# Patient Record
Sex: FEMALE | Race: WHITE | NOT HISPANIC OR LATINO | Employment: OTHER | URBAN - METROPOLITAN AREA
[De-identification: names, ages, dates, MRNs, and addresses within clinical notes are randomized per-mention and may not be internally consistent; named-entity substitution may affect disease eponyms.]

---

## 2020-08-13 LAB
EXTERNAL CHLAMYDIA RESULT: NEGATIVE
N GONORRHOEA RRNA SPEC QL PROBE: NEGATIVE

## 2020-08-27 ENCOUNTER — TRANSCRIBE ORDERS (OUTPATIENT)
Dept: PERINATAL CARE | Facility: CLINIC | Age: 30
End: 2020-08-27

## 2020-08-27 DIAGNOSIS — O09.899 SUPERVISION OF OTHER HIGH RISK PREGNANCIES, UNSPECIFIED TRIMESTER: ICD-10-CM

## 2020-08-27 DIAGNOSIS — O99.810 ABNORMAL GLUCOSE COMPLICATING PREGNANCY: Primary | ICD-10-CM

## 2020-08-27 LAB
EXTERNAL HIV SCREEN: NORMAL
HCV AB SER-ACNC: NORMAL

## 2020-08-31 ENCOUNTER — TRANSCRIBE ORDERS (OUTPATIENT)
Dept: PERINATAL CARE | Facility: CLINIC | Age: 30
End: 2020-08-31

## 2020-08-31 DIAGNOSIS — O09.899 SUPERVISION OF OTHER HIGH RISK PREGNANCIES, UNSPECIFIED TRIMESTER: Primary | ICD-10-CM

## 2020-09-14 ENCOUNTER — TELEMEDICINE (OUTPATIENT)
Dept: PERINATAL CARE | Facility: CLINIC | Age: 30
End: 2020-09-14
Payer: MEDICARE

## 2020-09-14 VITALS — WEIGHT: 275 LBS | HEIGHT: 65 IN | BODY MASS INDEX: 45.82 KG/M2

## 2020-09-14 DIAGNOSIS — Z3A.13 13 WEEKS GESTATION OF PREGNANCY: ICD-10-CM

## 2020-09-14 DIAGNOSIS — O99.211 OBESITY AFFECTING PREGNANCY IN FIRST TRIMESTER: ICD-10-CM

## 2020-09-14 DIAGNOSIS — O24.111 PRE-EXISTING TYPE 2 DIABETES MELLITUS WITH HYPERGLYCEMIA DURING PREGNANCY IN FIRST TRIMESTER (HCC): Primary | ICD-10-CM

## 2020-09-14 DIAGNOSIS — E11.65 PRE-EXISTING TYPE 2 DIABETES MELLITUS WITH HYPERGLYCEMIA DURING PREGNANCY IN FIRST TRIMESTER (HCC): Primary | ICD-10-CM

## 2020-09-14 DIAGNOSIS — O99.810 ABNORMAL GLUCOSE COMPLICATING PREGNANCY: ICD-10-CM

## 2020-09-14 DIAGNOSIS — R73.09 HEMOGLOBIN A1C GREATER THAN 9%, INDICATING POOR DIABETIC CONTROL: ICD-10-CM

## 2020-09-14 PROBLEM — F20.9 SCHIZOPHRENIA (HCC): Status: ACTIVE | Noted: 2018-12-02

## 2020-09-14 PROBLEM — F32.A DEPRESSION: Status: ACTIVE | Noted: 2020-09-14

## 2020-09-14 PROBLEM — Z87.2 HISTORY OF ECZEMA: Status: ACTIVE | Noted: 2020-09-14

## 2020-09-14 PROBLEM — O36.4XX0 IUFD AT 20 WEEKS OR MORE OF GESTATION: Status: ACTIVE | Noted: 2018-12-02

## 2020-09-14 PROCEDURE — 99215 OFFICE O/P EST HI 40 MIN: CPT | Performed by: NURSE PRACTITIONER

## 2020-09-14 RX ORDER — LANCETS 33 GAUGE
EACH MISCELLANEOUS
Qty: 100 EACH | Refills: 3 | Status: SHIPPED | OUTPATIENT
Start: 2020-09-14

## 2020-09-14 RX ORDER — INSULIN GLARGINE 100 [IU]/ML
30 INJECTION, SOLUTION SUBCUTANEOUS
Qty: 5 PEN | Refills: 0 | Status: SHIPPED | OUTPATIENT
Start: 2020-09-14

## 2020-09-14 RX ORDER — BLOOD SUGAR DIAGNOSTIC
STRIP MISCELLANEOUS
Qty: 100 EACH | Refills: 3 | Status: SHIPPED | OUTPATIENT
Start: 2020-09-14

## 2020-09-14 RX ORDER — BLOOD-GLUCOSE METER
EACH MISCELLANEOUS
Qty: 1 KIT | Refills: 0 | Status: SHIPPED | OUTPATIENT
Start: 2020-09-14

## 2020-09-14 NOTE — ASSESSMENT & PLAN NOTE
-Start Lantus 30 units at 7 PM daily  Add 3 AM glucose check    -Increase Metformin 500 mg with breakfast and 1000 mg with dinner   -Will need bolus insulin added to regimen    -Start SMBG and GDM diet     Lab Results   Component Value Date    HGBA1C 11 3 (H) 02/17/2020

## 2020-09-14 NOTE — PROGRESS NOTES
Virtual Regular Visit      Assessment/Plan:    Problem List Items Addressed This Visit        Endocrine    Pre-existing type 2 diabetes mellitus with hyperglycemia during pregnancy in first trimester ([facility] [address] ) - Primary     -Start Lantus 30 units at 7 PM daily  Add 3 AM glucose check    -Increase Metformin 500 mg with breakfast and 1000 mg with dinner   -Will need bolus insulin added to regimen    -Start SMBG and GDM diet     Lab Results   Component Value Date    HGBA1C 11 3 (H) 02/17/2020            Relevant Medications    Blood Glucose Monitoring Suppl (OneTouch Verio Flex System) w/Device KIT    OneTouch Delica Lancets 36N MISC    OneTouch Verio test strip    insulin glargine (Lantus SoloStar) 100 units/mL injection pen    Insulin Pen Needle 31G X 5 MM MISC    metFORMIN (GLUCOPHAGE) 500 mg tablet    Other Relevant Orders    Hemoglobin A1C    Comprehensive metabolic panel    TSH, 3rd generation with Free T4 reflex    Protein / creatinine ratio, urine    Mychart glucose flowsheet    Hemoglobin A1C greater than 9%, indicating poor diabetic control    Relevant Medications    insulin glargine (Lantus SoloStar) 100 units/mL injection pen    metFORMIN (GLUCOPHAGE) 500 mg tablet    Other Relevant Orders    Mychart glucose flowsheet       Other    13 weeks gestation of pregnancy    Relevant Medications    Blood Glucose Monitoring Suppl (OneTouch Verio Flex System) w/Device KIT    OneTouch Delica Lancets 27D MISC    OneTouch Verio test strip    insulin glargine (Lantus SoloStar) 100 units/mL injection pen    Insulin Pen Needle 31G X 5 MM MISC    metFORMIN (GLUCOPHAGE) 500 mg tablet    Other Relevant Orders    Hemoglobin A1C    Comprehensive metabolic panel    TSH, 3rd generation with Free T4 reflex    Protein / creatinine ratio, urine    Mychart glucose flowsheet    Obesity affecting pregnancy in first trimester    Relevant Medications    insulin glargine (Lantus SoloStar) 100 units/mL injection pen    Insulin Pen Needle 31G X 5 MM MISC    metFORMIN (GLUCOPHAGE) 500 mg tablet    Other Relevant Orders    Hemoglobin A1C    Comprehensive metabolic panel    TSH, 3rd generation with Free T4 reflex    Protein / creatinine ratio, urine    Mychart glucose flowsheet    BMI 45 0-49 9, adult (HCC)    Relevant Medications    insulin glargine (Lantus SoloStar) 100 units/mL injection pen    Insulin Pen Needle 31G X 5 MM MISC    metFORMIN (GLUCOPHAGE) 500 mg tablet    Other Relevant Orders    Hemoglobin A1C    Comprehensive metabolic panel    TSH, 3rd generation with Free T4 reflex    Protein / creatinine ratio, urine    Mychart glucose flowsheet    Abnormal glucose complicating pregnancy    Relevant Medications    metFORMIN (GLUCOPHAGE) 500 mg tablet    Other Relevant Orders    Mychart glucose flowsheet        -Check A1c, CMP, TSH with free T4 and urine protein/creatinine  -A1c goal is less than 6% with minimal hypoglycemia  -Due to elevated A1c and FBS>200's, start Lantus 30 units at 7 PM daily  Add 3 AM glucose check   -Increase Metformin 500 mg with breakfast and 1000 mg with dinner    -Please sign up for Tendr    -Review basaglar insulin pen education video online    -Via e-mail patient education regarding hypoglycemia; Lantus quick reference sheet; sites of injection; insulin requirements during pregnancy; basal bolus concept and pre-eclampsia  -Very important to maintain tight glucose control during pregnancy to decrease risk factors including fetal macrosomia;  hypoglycemia; birth injury; risk of ; risk of pre-eclampsia; polyhydramnios;  labor and stillbirth  1  Start self monitoring blood glucose fasting and 2 hours after start of each meal  Keep glucose log   Glucose goals: fasting 60-90 mg/dL, 135 mg/dL or less 1 hour post meals, and 120 mg/dL or less 2 hours post meal    2  Report glucose readings weekly via Tendr flowsheet or phone [phone]    3  Start GDM diet with 3 meals and 3 snacks including recommended combination of carb, protein and fat per meal/snack  4  Please eat meal or snack every 2-3 5 hours while awake  5  No more than 8 to 10 hours of fasting overnight  6  Stay active if no restriction from your OB, walk up to 30 minutes a day  7  Always have glucose available to treat hypoglycemia  Use 15:15 rule  Refer to hypoglycemia patient education sheet  Test blood sugar when experiencing signs and symptoms of hypoglycemia and prior to driving  8  Continue prenatal vitamin as recommended  9  Continue follow-up with your OB and MFM as recommended  10  Follow up in: 2-3 weeks  11  Keep dietitian appointment as scheduled  Reason for visit is T2DM  Chief Complaint   Patient presents with    Virtual Regular Visit    Diabetes Type 2    Patient Education        Encounter provider Nahed Delcid, [facility]    Provider located at [facility] [address] [address] [phone]      Recent Visits  No visits were found meeting these conditions  Showing recent visits within past 7 days and meeting all other requirements     Today's Visits  Date Type Provider Dept   09/14/20 Telemedicine Nahed Delcid, [facility] today's visits and meeting all other requirements     Future Appointments  No visits were found meeting these conditions  Showing future appointments within next 150 days and meeting all other requirements        The patient was identified by name and date of birth  Lazaro Nolen was informed that this is a telemedicine visit and that the visit is being conducted through [employer]  My office door was closed  No one else was in the room  She acknowledged consent and understanding of privacy and security of the video platform  The patient has agreed to participate and understands they can discontinue the visit at any time  Patient is aware this is a billable service  Subjective  Joseph Wilder is a 26 y o  female L0, stillborn at 9 1/2 months  Currently 13 0/7 weeks gestation and T2DM diagnosed 3/3020 with A1c 10 1% per Tabitha's mother, chart shows A1c 11 3% on 20, currently on Metformin 500 mg twice a day  Seen by a nutritionist  Domitila Sebastian to start on insulin sooner for pregnancy due to insurance issues  [facility]  Future plans for Greta Cisneros and  to move in with her mother  Referred for diabetes and pregnancy management  Currently eating 3 meals a day and 3 snacks a day  Testing glucose twice a day, reports glucose readings over 200's  Past Medical History:   Diagnosis Date    Chlamydia     Diabetes ([address] ) 2020    Migraines     Neurological disorder     Schizoaffective disorder (HCC)        Past Surgical History:   Procedure Laterality Date    TYMPANOSTOMY TUBE PLACEMENT      WISDOM TOOTH EXTRACTION         Current Outpatient Medications   Medication Sig Dispense Refill    Blood Glucose Monitoring Suppl (OneTouch Verio Flex System) w/Device KIT Dispense 1 kit per insurance formulary  1 kit 0    insulin glargine (Lantus SoloStar) 100 units/mL injection pen Inject 30 Units under the skin daily at bedtime At 7 PM daily  To be titrated  5 pen 0    Insulin Pen Needle 31G X 5 MM MISC Inject under the skin daily at bedtime Use one a day or as directed  100 each 1    metFORMIN (GLUCOPHAGE) 500 mg tablet Take 1 tablet with breakfast and 2 tablets with dinner  To be titrated to total 2000 mg a day  120 tablet 3    OneTouch Delica Lancets 74V MISC Use 4 a day or as directed  100 each 3    OneTouch Verio test strip Test 4 times a day and as instructed  Gestational diabetes  100 each 3    Prenatal Vit-Fe Fumarate-FA (PRENATAL VITAMIN PO) Take 1 tablet by mouth daily       No current facility-administered medications for this visit           Allergies   Allergen Reactions    Cefaclor Rash     Other reaction(s): Itching Review of Systems   Constitutional: Positive for fatigue  Negative for fever  HENT: Negative for congestion, sore throat and trouble swallowing  Eyes: Negative for visual disturbance  Last eye exam greater than 1 year ago  Respiratory: Negative for cough and shortness of breath  Cardiovascular: Negative for chest pain, palpitations and leg swelling  Gastrointestinal: Negative for constipation, diarrhea, nausea and vomiting  Endocrine: Positive for polydipsia and polyphagia  Negative for polyuria  Genitourinary: Negative for difficulty urinating and vaginal bleeding  Skin: Positive for rash  Eczema    Allergic/Immunologic: Negative for environmental allergies  Neurological: Positive for headaches  Negative for dizziness  History of migraines  Psychiatric/Behavioral: Negative for sleep disturbance  Video Exam    Vitals:    09/14/20 1053   Weight: 125 kg (275 lb)   Height: 5' 5" (1 651 m)       Physical Exam  Constitutional:       Appearance: She is obese  HENT:      Head: Normocephalic  Nose: Nose normal    Eyes:      Conjunctiva/sclera: Conjunctivae normal    Pulmonary:      Effort: Pulmonary effort is normal    Neurological:      Mental Status: She is alert and oriented to person, place, and time  Psychiatric:         Mood and Affect: Mood normal          Behavior: Behavior normal          Thought Content: Thought content normal          Judgment: Judgment normal           I spent 60  minutes directly with the patient during this visit  VIRTUAL VISIT DISCLAIMER    Dar Fontenot acknowledges that she has consented to an online visit or consultation  She understands that the online visit is based solely on information provided by her, and that, in the absence of a face-to-face physical evaluation by the physician, the diagnosis she receives is both limited and provisional in terms of accuracy and completeness   This is not intended to replace a full medical face-to-face evaluation by the physician  Sis Peraza understands and accepts these terms

## 2020-09-14 NOTE — PATIENT INSTRUCTIONS
-Check A1c, CMP, TSH with free T4 and urine protein/creatinine  -A1c goal is less than 6% with minimal hypoglycemia  -Due to elevated A1c and FBS>200's, start Lantus 30 units at 7 PM daily  Add 3 AM glucose check  Be sure to have bedtime snack that includes 15 grams of carbohydrates with 2 ounces of protein    -Increase Metformin 500 mg with breakfast and 1000 mg with dinner    -Please sign up for Game9z    -Review basaglar insulin pen education video online    -Via e-mail patient education regarding hypoglycemia; Lantus quick reference sheet; sites of injection; insulin requirements during pregnancy; basal bolus concept and pre-eclampsia  -Very important to maintain tight glucose control during pregnancy to decrease risk factors including fetal macrosomia;  hypoglycemia; birth injury; risk of ; risk of pre-eclampsia; polyhydramnios;  labor and stillbirth  1  Start self monitoring blood glucose fasting and 2 hours after start of each meal  Keep glucose log  Glucose goals: fasting 60-90 mg/dL, 135 mg/dL or less 1 hour post meals, and 120 mg/dL or less 2 hours post meal    2  Report glucose readings weekly via Game9z flowsheet or phone [phone]    3  Start GDM diet with 3 meals and 3 snacks including recommended combination of carb, protein and fat per meal/snack  4  Please eat meal or snack every 2-3 5 hours while awake  5  No more than 8 to 10 hours of fasting overnight  6  Stay active if no restriction from your OB, walk up to 30 minutes a day  7  Always have glucose available to treat hypoglycemia  Use 15:15 rule  Refer to hypoglycemia patient education sheet  Test blood sugar when experiencing signs and symptoms of hypoglycemia and prior to driving  8  Continue prenatal vitamin as recommended  9  Continue follow-up with your OB and MFM as recommended  10  Follow up in: 2-3 weeks  11  Keep dietitian appointment as scheduled

## 2020-09-15 ENCOUNTER — DOCUMENTATION (OUTPATIENT)
Dept: PERINATAL CARE | Facility: CLINIC | Age: 30
End: 2020-09-15

## 2020-09-15 ENCOUNTER — TELEMEDICINE (OUTPATIENT)
Dept: PERINATAL CARE | Facility: CLINIC | Age: 30
End: 2020-09-15
Payer: MEDICARE

## 2020-09-15 ENCOUNTER — TELEPHONE (OUTPATIENT)
Dept: PERINATAL CARE | Facility: CLINIC | Age: 30
End: 2020-09-15

## 2020-09-15 DIAGNOSIS — R73.09 HEMOGLOBIN A1C GREATER THAN 9%, INDICATING POOR DIABETIC CONTROL: ICD-10-CM

## 2020-09-15 DIAGNOSIS — O99.211 OBESITY AFFECTING PREGNANCY IN FIRST TRIMESTER: ICD-10-CM

## 2020-09-15 DIAGNOSIS — E11.65 PRE-EXISTING TYPE 2 DIABETES MELLITUS WITH HYPERGLYCEMIA DURING PREGNANCY IN FIRST TRIMESTER (HCC): Primary | ICD-10-CM

## 2020-09-15 DIAGNOSIS — O24.111 PRE-EXISTING TYPE 2 DIABETES MELLITUS WITH HYPERGLYCEMIA DURING PREGNANCY IN FIRST TRIMESTER (HCC): Primary | ICD-10-CM

## 2020-09-15 DIAGNOSIS — Z3A.13 13 WEEKS GESTATION OF PREGNANCY: ICD-10-CM

## 2020-09-15 PROCEDURE — G0108 DIAB MANAGE TRN  PER INDIV: HCPCS

## 2020-09-15 NOTE — PROGRESS NOTES
Demographics:  Language: English  Ethnicity: White/   Country of Origin: [place]    Education/Occupation:  Highest grade completed: not sure of grade completed  Occupation: Homemaker    Personal & Family History:  Personal history of diabetes? Yes type 2  Family members with diabetes: Mother, Father and maternal and paternal grandparents    Pregnancy History:  How many total pregnancies have you had? Other 7  How many children do you have? Other 0  Are you having swelling or fluid retention? no  Have you been placed on bedrest? no    Physical Activity:  Do you exercise? yes  Type of Exercise: Walking  Frequency of Exercise: Daily with the dogs    Nutrition Questionnaire: How many meals do you eat daily? 3  List times of meals: Breakfast: 10:30 am/ Lunch: 2-3 PM/ Dinner: 6:30 PM  How many snacks do you eat daily? 2  List times of snacks: Other between meals  What type of diet are you following at home? Regular  Do you have special or ethnic dietary preferences? no  Do you have any food allergies or intolerances? no  When was your last meal? 10 am  List what you ate and the amounts: whole wheat toast with butter and jelly, cheese    Learning preferences: How do you learn best? Video  How do you rate your health? Ayah Alexandra  Who is your primary support person? Spouse and Family Member mother  How do you cope with stress? Patient reported not that good  Do you have any cultural or Jainism beliefs we should be aware of? no  How do you feel the diabetes diagnosis will affect the rest of your pregnancy? Not sure but nervous  Do you receive WIC or food stamps?  Yes both

## 2020-09-15 NOTE — TELEPHONE ENCOUNTER
Attempted to reach patient by phone and left voicemail to confirm appointment for MFM ultrasound  1 support person ( must be over the age of 15) may accompany you for your appointment  If you or your support person have traveled outside the state in the past 2 weeks, please call and notify our office today #[phone]  You and your support person must wear a mask ,covering nose and mouth,during your entire visit  You and your support person will have temperature screened upon arrival     To minimize your exposure in our waiting room, please call our office prior to entering the building  Check in and rooming questions will be done via phone  We will give you directions when to enter for your appointment  Inside office # provided:  Jill Fuentes line: [phone]  [facility] line:  [phone]  [facility] line:  [phone] line:  [phone]  Ximena Marshall line:  [phone]  [place] line:  [phone]    IF you are not feeling well- cough, fever, shortness of breath or any flu like symptoms, contact your primary care physician or [facility] Mike    Any questions with these instructions please call Maternal Fetal Medicine nurse line today @ # [phone]

## 2020-09-15 NOTE — PROGRESS NOTES
Virtual Regular Visit      Assessment/Plan:    Problem List Items Addressed This Visit     None               Reason for visit is   Chief Complaint   Patient presents with    Diabetes Type 2    Patient Education    Virtual Regular Visit        Encounter provider Hermes Cancino    Provider located at [facility] [address] [address] [phone]      Recent Visits  Date Type Provider Dept   09/14/20 [address] [facility], [facility] recent visits within past 7 days and meeting all other requirements     Today's Visits  Date Type Provider Dept   09/15/20 1401 [address]   Showing today's visits and meeting all other requirements     Future Appointments  No visits were found meeting these conditions  Showing future appointments within next 150 days and meeting all other requirements        The patient was identified by name and date of birth  Andres Lozano was informed that this is a telemedicine visit and that the visit is being conducted through LOCK8  My office door was closed  No one else was in the room  Patient's mother and  were also present for today's education virtually  She acknowledged consent and understanding of privacy and security of the video platform  The patient has agreed to participate and understands they can discontinue the visit at any time  Patient is aware this is a billable service  Subjective  Andres Lozano is a 26 y  o pregnant female         HPI     Past Medical History:   Diagnosis Date    Chlamydia     Diabetes ([facility] [address] ) 03/2020    Migraines     Neurological disorder     Schizoaffective disorder (HCC)        Past Surgical History:   Procedure Laterality Date    TYMPANOSTOMY TUBE PLACEMENT      WISDOM TOOTH EXTRACTION         Current Outpatient Medications   Medication Sig Dispense Refill    Blood Glucose Monitoring Suppl (OneTouch Verio Flex System) w/Device KIT Dispense 1 kit per insurance formulary  1 kit 0    insulin glargine (Lantus SoloStar) 100 units/mL injection pen Inject 30 Units under the skin daily at bedtime At 7 PM daily  To be titrated  5 pen 0    Insulin Pen Needle 31G X 5 MM MISC Inject under the skin daily at bedtime Use one a day or as directed  100 each 1    metFORMIN (GLUCOPHAGE) 500 mg tablet Take 1 tablet with breakfast and 2 tablets with dinner  To be titrated to total 2000 mg a day  120 tablet 3    OneTouch Delica Lancets 97V MISC Use 4 a day or as directed  100 each 3    OneTouch Verio test strip Test 4 times a day and as instructed  Gestational diabetes  100 each 3    Prenatal Vit-Fe Fumarate-FA (PRENATAL VITAMIN PO) Take 1 tablet by mouth daily       No current facility-administered medications for this visit  Allergies   Allergen Reactions    Cefaclor Rash     Other reaction(s): Itching       Review of Systems    Video Exam    There were no vitals filed for this visit  Physical Exam     I spent 10:30-11:40AM minutes with patient today in which greater than 50% of the time was spent in counseling/coordination of care regarding pre-existing type 2 diabetes       VIRTUAL VISIT DISCLAIMER    Elisha Sahu acknowledges that she has consented to an online visit or consultation  She understands that the online visit is based solely on information provided by her, and that, in the absence of a face-to-face physical evaluation by the physician, the diagnosis she receives is both limited and provisional in terms of accuracy and completeness  This is not intended to replace a full medical face-to-face evaluation by the physician  Elisha Sahu understands and accepts these terms      09/15/20  Elisha Sahu   1990  Estimated Date of Delivery: 3/22/21   EGA: 13w1d  Referring Provider: Jacinto Wolf    Thank you for referring your patient to the Diabetes and Pregnancy Program at [address] [place] [address]  The patient has a history of pre-existing type 2 diabetes and recently met with MILTON Ramirez on 9/15/20  Noted 20 Hba1c 11 3%  Patient was taking Metformin prior to pregnancy  The patient attended class 1 virtual visit and patient received the following education:     Pathophysiology of diabetes and pregnancy  This includes maternal-fetal complications such as fetal macrosomia,  hypoglycemia, polyhydramnios, increased incidence of  section, pre-term labor and in severe cases, fetal demise and stillbirth   Instruction on diet and glucometer use was provided  Self-monitoring of blood glucose levels: fasting (goal 60mg/dl to 90mg/dl) and two hours after the start of the meal less (goal less than 120mg/dl)  The patient was provided with a OneTouch Verio blood glucose meter and supplies at yesterday's visit  Patient's mother is a nurse and is helping patient with glucose monitoring  Answered questions patient had today regarding glucose meter and testing blood sugars  Encouraged patient to record blood glucose measurements   Medical Nutrition Therapy for diabetes and pregnancy  The patient was provided with a 2200 calorie meal plan and the following was reviewed:     o Basic review of macronutrients   o Meal pattern should consist of three small meals and three snacks daily  MyPlate concept was reviewed  Patient reported having recently spoken to a dietitian regarding her diet  o Carbohydrate gram amounts per meal   o Instructions on how to read a food label  o Appropriate serving sizes for carbohydrates and proteins  Sample meals were discussed incorporating patient's food preferences and foods obtained through the [address] [place] [address]  Patient and her  will be living with her mother during her pregnancy  Patient's mother to assist with meal preparation, blood glucose monitoring and insulin injection   Patient's mother reported insulin injection will begin tonight     o Incorporating protein at each meal and snack  o Maintain a three day food diary and bring to class 2 virtual visit   Report blood glucose levels to the [address] [place] [address] Way weekly or as directed:  o Phone : [phone]  If no response in 24 hours, call [phone]   o Fax: [phone]  o Hussain  Patient's mother has signed up for [address]  Advised to call MILTON Ramirez at [phone] if there are any problems completing glucose flow sheet  The patient is scheduled to attend class 2 in 2 weeks  M Mehdi Diabetes and Pregnancy  to schedule virtual visit  Additionally, fetal ultrasound evaluation by the Perinatologist has been scheduled to assure continuity of care  Patient Stated Goal: "I will eat 3 meals and 3 snacks each day, including protein at each"  Diabetes Self Management Support Plan outside of ongoing care: Spouse/Family patient's mother and   Please contact the Diabetes and Pregnancy Program at [phone] if you have any questions  Time spent with patient 10:30-11:40 AM; time spent face to face counseling greater than 50% of the appointment      Shaheed Jay RD,LDN,CDE  Diabetes Educator   Diabetes and Pregnancy Program

## 2020-09-16 ENCOUNTER — ROUTINE PRENATAL (OUTPATIENT)
Dept: PERINATAL CARE | Facility: CLINIC | Age: 30
End: 2020-09-16
Payer: MEDICARE

## 2020-09-16 VITALS
WEIGHT: 276.2 LBS | BODY MASS INDEX: 46.02 KG/M2 | DIASTOLIC BLOOD PRESSURE: 72 MMHG | HEIGHT: 65 IN | SYSTOLIC BLOOD PRESSURE: 118 MMHG | HEART RATE: 97 BPM | TEMPERATURE: 98.3 F

## 2020-09-16 DIAGNOSIS — Z3A.13 13 WEEKS GESTATION OF PREGNANCY: ICD-10-CM

## 2020-09-16 DIAGNOSIS — Z36.82 ENCOUNTER FOR NUCHAL TRANSLUCENCY TESTING: ICD-10-CM

## 2020-09-16 DIAGNOSIS — E11.65 PRE-EXISTING TYPE 2 DIABETES MELLITUS WITH HYPERGLYCEMIA DURING PREGNANCY IN FIRST TRIMESTER (HCC): Primary | ICD-10-CM

## 2020-09-16 DIAGNOSIS — O09.899 SUPERVISION OF OTHER HIGH RISK PREGNANCIES, UNSPECIFIED TRIMESTER: ICD-10-CM

## 2020-09-16 DIAGNOSIS — O24.111 PRE-EXISTING TYPE 2 DIABETES MELLITUS WITH HYPERGLYCEMIA DURING PREGNANCY IN FIRST TRIMESTER (HCC): Primary | ICD-10-CM

## 2020-09-16 PROCEDURE — 99213 OFFICE O/P EST LOW 20 MIN: CPT | Performed by: OBSTETRICS & GYNECOLOGY

## 2020-09-16 PROCEDURE — 76801 OB US < 14 WKS SINGLE FETUS: CPT | Performed by: OBSTETRICS & GYNECOLOGY

## 2020-09-16 PROCEDURE — 76813 OB US NUCHAL MEAS 1 GEST: CPT | Performed by: OBSTETRICS & GYNECOLOGY

## 2020-09-16 NOTE — LETTER
September 16, 2020     Nish Vogel, [address]  [address]  [address] [facility] [address] [address]    Patient: Merry Bird   YOB: 1990   Date of Visit: 9/16/2020       Dear Dr Bee Sensing: Thank you for referring Merry Bird to me for evaluation  Below are my notes for this consultation  If you have questions, please do not hesitate to call me  I look forward to following your patient along with you  Sincerely,        Marie Gentile MD        CC: No Recipients  Marie Gentile MD  9/16/2020  2:58 PM  Sign when Signing Visit  CONSULT NOTE    Nish Vogel, [address] at [address] [facility], [address]     Thank you for referring your Merry Bird for a Maternal-Fetal Medicine Consultation:  Below is my consultation  Thank you very much for requesting a consultation on this very nice patient for the indication of significant obstetrical history and poorly-controlled type 2 diabetes with very high hemoglobin A1c  The patient has had 6 prior pregnancies, her last of which was a stillbirth born at unknown gestational age and weight was 2 lb 11 oz reportedly  There were dysmorphic features including low-set ears, thickened neck, low and wide set nipples, posterior anus  There was resuscitation performed on the fetus however it was likely that the fetus passed in utero  Genetics were not performed  The patient has significant intellectual disability with an IQ documented of less than 70  This is reported from other that it was from birth secondary to oxygen deprivation   also has significant mental retardation  Reported head trauma in 2010  She has a history of schizophrenia which is reportedly stable off medications  She was diagnosed with type 2 diabetes after her stillbirth  Her most recent hemoglobin A1c documented is 11 3%      Tennille Seek was treated for Chlamydia in August  She has no reported substance use history  She currently takes prenatal vitamins and metformin as well as triamcinalone cream for exzema  There is a strong family history of diabetes inpatient, both parents, and grandparents  She reportedly had hypertension during her prior delivery in 2018  We discussed the options for genetic screening, including but not limited to first trimester screening, second trimester screening, combined first and second trimester screening, noninvasive prenatal testing (NIPT) for patients at high risk and diagnostic screening through the use of CVS and amniocentesis  We discussed the risks and benefits of each approach including the sensitivities and false positive rates as well as the difference between a screening test and a diagnostic test   At the conclusion of our discussion the patient declined any Screening to delineate her risk for fetal aneuploidy  Patient's mother states, "she is keeping the baby no matter what "    We discussed her diabetes  Her hemoglobin A1c of greater than 11% significantly increases the risk for congenital birth defects  We discussed the significant increased risk for open neural tube defects and congenital heart defects  We discussed the limitations of anatomy at this gestational age  We cannot evaluate the fetal heart at this gestational age secondary to fetal position, maternal body habitus, and challenging penetration  We discussed the importance of euglycemia  She is currently working with our diabetes in pregnancy program and initiated on insulin 30 units of Lantus in the evening  Her blood glucose fasting this morning was 166  She is massively obese with a BMI of greater than 45  Mitigating weight gain to no more than 15 lb throughout the remainder of the pregnancy is recommended  Close follow-up with diabetes in pregnancy utilizing dietary services is recommended  Patient has significant psychiatric history and very low IQ  Recommend social work consultation, children and youth evaluation, and psychiatry evaluation  Recommend low-dose aspirin 162 mg daily to mitigate risks associated with preeclampsia  We discussed follow-up in detail and I recommend an anatomy ultrasound be scheduled for 16 weeks gestation  A detailed fetal anatomic evaluation will be performed at 20 weeks and a fetal echocardiogram at 22 weeks  Serial growth evaluations are recommended thereafter every 4 weeks with initiation of  surveillance starting between at around 28 weeks secondary to prior stillbirth history and poorly controlled diabetes with significant intellectual disability  Thank you very much for allowing us to participate in the care of this very nice patient  Should you have any questions, please do not hesitate to contact our office  Please note, in addition to the time spent discussing the results of the ultrasound, I spent approximately 15 minutes of face-to-face time with the patient, greater than 50% of which was spent in counseling and the coordination of care for this patient  Portions of the record may have been created with voice recognition software  Occasional wrong word or "sound a like" substitutions may have occurred due to the inherent limitations of voice recognition software  Read the chart carefully and recognize, using context, where substitutions have occurred  Oskar Dale MD  Attending Physician, [place]

## 2020-09-16 NOTE — PROGRESS NOTES
CONSULT NOTE    Kenya Ojeda, [address] at [address]  [address] [place] [address]     Thank you for referring your Kisha Granados for a Maternal-Fetal Medicine Consultation:  Below is my consultation  Thank you very much for requesting a consultation on this very nice patient for the indication of significant obstetrical history and poorly-controlled type 2 diabetes with very high hemoglobin A1c  The patient has had 6 prior pregnancies, her last of which was a stillbirth born at unknown gestational age and weight was 2 lb 11 oz reportedly  There were dysmorphic features including low-set ears, thickened neck, low and wide set nipples, posterior anus  There was resuscitation performed on the fetus however it was likely that the fetus passed in utero  Genetics were not performed  The patient has significant intellectual disability with an IQ documented of less than 70  This is reported from other that it was from birth secondary to oxygen deprivation   also has significant mental retardation  Reported head trauma in 2010  She has a history of schizophrenia which is reportedly stable off medications  She was diagnosed with type 2 diabetes after her stillbirth  Her most recent hemoglobin A1c documented is 11 3%  Caleb Garcia was treated for Chlamydia in August   She has no reported substance use history  She currently takes prenatal vitamins and metformin as well as triamcinalone cream for exzema  There is a strong family history of diabetes inpatient, both parents, and grandparents  She reportedly had hypertension during her prior delivery in 2018  We discussed the options for genetic screening, including but not limited to first trimester screening, second trimester screening, combined first and second trimester screening, noninvasive prenatal testing (NIPT) for patients at high risk and diagnostic screening through the use of CVS and amniocentesis  We discussed the risks and benefits of each approach including the sensitivities and false positive rates as well as the difference between a screening test and a diagnostic test   At the conclusion of our discussion the patient declined any Screening to delineate her risk for fetal aneuploidy  Patient's mother states, "she is keeping the baby no matter what "    We discussed her diabetes  Her hemoglobin A1c of greater than 11% significantly increases the risk for congenital birth defects  We discussed the significant increased risk for open neural tube defects and congenital heart defects  We discussed the limitations of anatomy at this gestational age  We cannot evaluate the fetal heart at this gestational age secondary to fetal position, maternal body habitus, and challenging penetration  We discussed the importance of euglycemia  She is currently working with our diabetes in pregnancy program and initiated on insulin 30 units of Lantus in the evening  Her blood glucose fasting this morning was 166  She is massively obese with a BMI of greater than 45  Mitigating weight gain to no more than 15 lb throughout the remainder of the pregnancy is recommended  Close follow-up with diabetes in pregnancy utilizing dietary services is recommended  Patient has significant psychiatric history and very low IQ  Recommend social work consultation, children and youth evaluation, and psychiatry evaluation  Recommend low-dose aspirin 162 mg daily to mitigate risks associated with preeclampsia  We discussed follow-up in detail and I recommend an anatomy ultrasound be scheduled for 16 weeks gestation  A detailed fetal anatomic evaluation will be performed at 20 weeks and a fetal echocardiogram at 22 weeks    Serial growth evaluations are recommended thereafter every 4 weeks with initiation of  surveillance starting between at around 28 weeks secondary to prior stillbirth history and poorly controlled diabetes with significant intellectual disability  Thank you very much for allowing us to participate in the care of this very nice patient  Should you have any questions, please do not hesitate to contact our office  Please note, in addition to the time spent discussing the results of the ultrasound, I spent approximately 15 minutes of face-to-face time with the patient, greater than 50% of which was spent in counseling and the coordination of care for this patient  Portions of the record may have been created with voice recognition software  Occasional wrong word or "sound a like" substitutions may have occurred due to the inherent limitations of voice recognition software  Read the chart carefully and recognize, using context, where substitutions have occurred  Oskar Waller MD  Attending Physician, [place]

## 2020-09-17 LAB
ALBUMIN SERPL-MCNC: 4 G/DL (ref 3.8–4.8)
ALBUMIN/GLOB SERPL: 1.4 {RATIO} (ref 1.2–2.2)
ALP SERPL-CCNC: 80 IU/L (ref 39–117)
ALT SERPL-CCNC: 73 IU/L (ref 0–32)
AST SERPL-CCNC: 42 IU/L (ref 0–40)
BILIRUB SERPL-MCNC: 0.4 MG/DL (ref 0–1.2)
BUN SERPL-MCNC: 9 MG/DL (ref 6–20)
BUN/CREAT SERPL: 18 (ref 9–23)
CALCIUM SERPL-MCNC: 8.6 MG/DL (ref 8.7–10.2)
CHLORIDE SERPL-SCNC: 101 MMOL/L (ref 96–106)
CO2 SERPL-SCNC: 20 MMOL/L (ref 20–29)
CREAT SERPL-MCNC: 0.5 MG/DL (ref 0.57–1)
CREAT UR-MCNC: 153.2 MG/DL
GLOBULIN SER-MCNC: 2.9 G/DL (ref 1.5–4.5)
GLUCOSE SERPL-MCNC: 106 MG/DL (ref 65–99)
HBA1C MFR BLD: 6.9 % (ref 4.8–5.6)
POTASSIUM SERPL-SCNC: 3.7 MMOL/L (ref 3.5–5.2)
PROT SERPL-MCNC: 6.9 G/DL (ref 6–8.5)
PROT UR-MCNC: 22.2 MG/DL
PROT/CREAT UR: 145 MG/G CREAT (ref 0–200)
SL AMB EGFR AFRICAN AMERICAN: 148 ML/MIN/1.73
SL AMB EGFR NON AFRICAN AMERICAN: 128 ML/MIN/1.73
SODIUM SERPL-SCNC: 135 MMOL/L (ref 134–144)
TSH SERPL DL<=0.005 MIU/L-ACNC: 1.08 UIU/ML (ref 0.45–4.5)

## 2020-09-18 ENCOUNTER — TELEPHONE (OUTPATIENT)
Dept: PERINATAL CARE | Facility: CLINIC | Age: 30
End: 2020-09-18

## 2020-09-18 NOTE — TELEPHONE ENCOUNTER
Pts mother called office stating she received a call from [email or web address] and was to get an email from her  States she did not receive the email  [employer] requests to call the pts mother Vega Willams back  Vega Willams receptive and number to call back is [phone]

## 2020-09-22 ENCOUNTER — DOCUMENTATION (OUTPATIENT)
Dept: PERINATAL CARE | Facility: CLINIC | Age: 30
End: 2020-09-22

## 2020-09-22 NOTE — PROGRESS NOTES
Date:  20  RE: Lazaro Nolen    : 1990  Estimated Date of Delivery: 3/22/21  EGA: 14w2d  OB/GYN: MILTON Krishna  at [facility]   Type 2 Diabetes with pregnancy requiring nsulin         Reported via my Glucose Flow Sheet    Current regimen:  Lantus--30 units at 7 PM  Metformin--500 mg with breakfast & 1000 mg with dinner  2200 calorie GDM diet with 3 meals & 3 snacks  Self-Blood Glucose Monitoring 4 times daily with a One-Touch Verio glucose meter    Plan:  Lantus--Increase from 30 to 36 units at 7 PM  Metformin--Increase from 500 to 1000 mg at breakfast & continue 1000 mg with dinner  Continue diet & testing  Stop 3 AM testing  20 HbA1c 6 9% decreased form 11 3% on 20 with metformin  Class 2 scheduled for Friday, 20  Andrei Jaquez Date due to report next:   At Class 2, Friday, 20    Darek Puri, MS, RD, CDE  Diabetes Educator  Diabetes [facility] Pregnancy Program

## 2020-09-23 ENCOUNTER — PATIENT MESSAGE (OUTPATIENT)
Dept: PERINATAL CARE | Facility: CLINIC | Age: 30
End: 2020-09-23

## 2020-09-25 ENCOUNTER — DOCUMENTATION (OUTPATIENT)
Dept: PERINATAL CARE | Facility: CLINIC | Age: 30
End: 2020-09-25

## 2020-09-25 ENCOUNTER — TELEMEDICINE (OUTPATIENT)
Dept: PERINATAL CARE | Facility: CLINIC | Age: 30
End: 2020-09-25
Payer: MEDICARE

## 2020-09-25 DIAGNOSIS — Z3A.14 14 WEEKS GESTATION OF PREGNANCY: ICD-10-CM

## 2020-09-25 DIAGNOSIS — O99.212 OBESITY AFFECTING PREGNANCY IN SECOND TRIMESTER: ICD-10-CM

## 2020-09-25 DIAGNOSIS — O24.112 PRE-EXISTING TYPE 2 DIABETES MELLITUS WITH HYPERGLYCEMIA DURING PREGNANCY IN SECOND TRIMESTER (HCC): Primary | ICD-10-CM

## 2020-09-25 DIAGNOSIS — E11.65 PRE-EXISTING TYPE 2 DIABETES MELLITUS WITH HYPERGLYCEMIA DURING PREGNANCY IN SECOND TRIMESTER (HCC): ICD-10-CM

## 2020-09-25 DIAGNOSIS — O24.112 PRE-EXISTING TYPE 2 DIABETES MELLITUS DURING PREGNANCY IN SECOND TRIMESTER: Primary | ICD-10-CM

## 2020-09-25 DIAGNOSIS — O24.112 PRE-EXISTING TYPE 2 DIABETES MELLITUS WITH HYPERGLYCEMIA DURING PREGNANCY IN SECOND TRIMESTER (HCC): ICD-10-CM

## 2020-09-25 DIAGNOSIS — E11.65 PRE-EXISTING TYPE 2 DIABETES MELLITUS WITH HYPERGLYCEMIA DURING PREGNANCY IN SECOND TRIMESTER (HCC): Primary | ICD-10-CM

## 2020-09-25 PROCEDURE — G0108 DIAB MANAGE TRN  PER INDIV: HCPCS

## 2020-09-25 RX ORDER — INSULIN LISPRO 100 [IU]/ML
4 INJECTION, SOLUTION INTRAVENOUS; SUBCUTANEOUS
Qty: 5 PEN | Refills: 0 | Status: SHIPPED | OUTPATIENT
Start: 2020-09-25 | End: 2020-09-25 | Stop reason: SDUPTHER

## 2020-09-25 RX ORDER — INSULIN LISPRO 100 [IU]/ML
4 INJECTION, SOLUTION INTRAVENOUS; SUBCUTANEOUS
Qty: 5 PEN | Refills: 0 | Status: SHIPPED | OUTPATIENT
Start: 2020-09-25

## 2020-09-25 NOTE — PROGRESS NOTES
Virtual Brief Visit    Assessment/Plan:    Problem List Items Addressed This Visit     None                Reason for visit is   Chief Complaint   Patient presents with    Diabetes Type 2    Patient Education    Virtual Brief Visit        Encounter provider Marisela Mays    Provider located at [facility] [address] [address] [phone]    Recent Visits  Date Type Provider Dept   09/18/20 Telephone [address]   Showing recent visits within past 7 days and meeting all other requirements     Today's Visits  Date Type Provider Dept   09/25/20 [address]   Showing today's visits and meeting all other requirements     Future Appointments  No visits were found meeting these conditions  Showing future appointments within next 150 days and meeting all other requirements        After connecting through [employer] Teams, the patient was identified by name and date of birth  Joseph Wilder was informed that this is a telemedicine visit and that the visit is being conducted through [employer]  My office door was closed  No one else was in the room  She acknowledged consent and understanding of privacy and security of the platform  The patient has agreed to participate and understands she can discontinue the visit at any time  Patient was unable to connect to virtual visit and therefore education was completed by phone  Patient's mother and  were also present during education  Patient is aware this is a billable service  Subjective    Joseph Wilder is a 26 y  o pregnant female    HPI     Past Medical History:   Diagnosis Date    Chlamydia     Diabetes ([facility] [address] ) 03/2020    Mentally challenged     Migraines     Neurological disorder     Schizoaffective disorder [facility])        Past Surgical History:   Procedure Laterality Date    TYMPANOSTOMY TUBE PLACEMENT      WISDOM TOOTH EXTRACTION         Current Outpatient Medications   Medication Sig Dispense Refill    Blood Glucose Monitoring Suppl (OneTouch Verio Flex System) w/Device KIT Dispense 1 kit per insurance formulary  1 kit 0    insulin glargine (Lantus SoloStar) 100 units/mL injection pen Inject 30 Units under the skin daily at bedtime At 7 PM daily  To be titrated  5 pen 0    insulin lispro (HumaLOG KwikPen) 100 units/mL injection pen Inject 4 Units under the skin 3 (three) times a day with meals 5 pen 0    Insulin Pen Needle 31G X 5 MM MISC Inject under the skin daily at bedtime Use one a day or as directed  100 each 1    metFORMIN (GLUCOPHAGE) 500 mg tablet Take 1 tablet with breakfast and 2 tablets with dinner  To be titrated to total 2000 mg a day  120 tablet 3    OneTouch Delica Lancets 76L MISC Use 4 a day or as directed  100 each 3    OneTouch Verio test strip Test 4 times a day and as instructed  Gestational diabetes  100 each 3    Prenatal Vit-Fe Fumarate-FA (PRENATAL VITAMIN PO) Take 1 tablet by mouth daily       No current facility-administered medications for this visit  Allergies   Allergen Reactions    Cefaclor Rash     Other reaction(s): Itching       Review of Systems    There were no vitals filed for this visit  I spent 60 minutes with patient today in which greater than 50% of the time was spent in counseling/coordination of care regarding pre-existing type 2 diabetes in the second trimester of pregnancy  VIRTUAL VISIT DISCLAIMER    Joseph Lemusancelmo acknowledges that she has consented to an online visit or consultation  She understands that the online visit is based solely on information provided by her, and that, in the absence of a face-to-face physical evaluation by the physician, the diagnosis she receives is both limited and provisional in terms of accuracy and completeness   This is not intended to replace a full medical face-to-face evaluation by the physician  Alejandrina Arauz understands and accepts these terms  DATE:  20  RE: Alejandrina Arauz    : 1990    EVELIO: Estimated Date of Delivery: 3/22/21    EGA: 14w4d  Referring Provider: Anel Wallace      Thank you for referring your patient to the Diabetes and Pregnancy Program at [address]  The patient has a history of Type 2 diabetes and was taking Metformin prior to pregnancy  Noted 20 HbA1c 6 9% down from 20 result of 11 3%  The patient and patient's mother as well as her  attended Class 2 virtual telephone visit and received the following education:    Weight gain during in pregnancy  Based on the patients height of 65 inches, pre-pregnancy weight of 269 pounds 44 75 BMI, we would recommend a total weight gain of 11-20 pounds for the pregnancy   The patients current weight is 275 pounds, and her weight gain to date is 6 pounds  Based on this, we recommend a weight gain of no more than 14 pounds for the remainder of the pregnancy   Medical Nutrition Therapy for diabetes and pregnancy  The patients three day food diary was reviewed and discussed  The patient was instructed on the following:  o Individualized meal plan    o Use of food diary to maintain a meal plan  Patient has been following the diet based on food diary review  Timing of meals and snacks as well as testing is as recommended    o Importance of protein as it relates to blood glucose control   Review of blood glucose log  Reinforcement of blood glucose goals and reporting guidelines   Ultrasounds every four weeks in the [address] to evaluate fetal growth   Exercise Guidelines:   o Walking up to thirty minutes daily can reduce blood glucose levels     o Monitor for greater than four contractions per hour     o The patient has been instructed not to begin physical activity if she has been instructed not to exercise by your office   Sick day guidelines and hypoglycemia with treatment   Basal/bolus concept explained to patient following review of blood sugars  Please see blood glucose log for details  Patient reported feeling overwhelmed but understood why fast acting insulin is recommended  Patient's mother reported safety concerns if patient were to incorrectly inject the wrong insulin  Suggested 2 different storage locations and marking the fast acting insulin with red tape to differentiate between Lantus and Humalog   Report blood glucose levels to [address] weekly or as directed  o Phone: [phone]  If no response in 24 hours, call [phone]    o Fax: [phone]  o Mychart  o Patient is scheduled for follow up on Monday, 9/28 with MILTON Ramirez  Case was discussed with Chelsea following virtual visit today  Diabetes Self Management Support Plan outside of ongoing care: Spouse/Family    Please contact the Diabetes and Pregnancy Program at [phone] if you have questions  Time spent with patient 9:00-10:00 AM; time spent face to face counseling greater than 50% of the appointment        Jennifer Coburn RD,LDN,CDE  Diabetes Educator  Diabetes and Pregnancy Program

## 2020-09-25 NOTE — PROGRESS NOTES
Date:  20  RE: Suman Brock    : 1990  Estimated Date of Delivery: 3/22/21  EGA: 14w4d  OB/GYN: MILTON Valdez  at [facility]   Type 2 Diabetes with pregnancy requiring nsulin         Reported via my Glucose Flow Sheet    Current regimen:  Lantus--36 units at 7 PM  Metformin-- Recently increased to 1000 mg with breakfast &continue 1000 mg with dinner  Patient is tolerating Metformin without problems  2200 calorie GDM diet with 3 meals & 3 snacks  Self-Blood Glucose Monitoring 4 times daily with a One-Touch Verio glucose meter    Plan: Discussed with MILTON Ramirez today  Follow up telemedicine appointment today  Basal/bolus concept explained  Patient was upset regarding multiple daily injections plus fingersticks  Patient was accepting of starting Humalog following discussion today  Patient's mother reported safety concerns if patient incorrectly injects the wrong insulin  Advised storing Lantus and Humalog in 2 different locations and to vadim with red the Humalog pen to avoid confusion  Lantus--advised patient to inject previously recommended 38 units at 7 PM per MILTON Hitchcock  Start Humalog- 4 units with breakfast, lunch and dinner  Patient may start injecting with dinner meal only given that she was upset and overwhelmed with multiple injections  Metformin--continue 1000 mg at breakfast & continue 1000 mg with dinner  Continue diet & testing  20 HbA1c 6 9% decreased form 11 3% on 20 with metformin  Patient has a MILTON follow up visit scheduled on Monday   A second follow up appointment to be scheduled in 3 weeks    US scheduled for 10/12/20    Date due to report next:  Monday,  20    Fany Nunez  Diabetes Educator  Diabetes & Pregnancy Program

## 2020-09-28 ENCOUNTER — TELEPHONE (OUTPATIENT)
Dept: PERINATAL CARE | Facility: CLINIC | Age: 30
End: 2020-09-28

## 2020-09-28 ENCOUNTER — TELEMEDICINE (OUTPATIENT)
Dept: PERINATAL CARE | Facility: CLINIC | Age: 30
End: 2020-09-28
Payer: MEDICARE

## 2020-09-28 VITALS — WEIGHT: 275 LBS | HEIGHT: 65 IN | BODY MASS INDEX: 45.82 KG/M2

## 2020-09-28 DIAGNOSIS — O99.212 OBESITY AFFECTING PREGNANCY IN SECOND TRIMESTER: ICD-10-CM

## 2020-09-28 DIAGNOSIS — E11.65 PRE-EXISTING TYPE 2 DIABETES MELLITUS WITH HYPERGLYCEMIA DURING PREGNANCY IN FIRST TRIMESTER (HCC): ICD-10-CM

## 2020-09-28 DIAGNOSIS — O24.111 PRE-EXISTING TYPE 2 DIABETES MELLITUS WITH HYPERGLYCEMIA DURING PREGNANCY IN FIRST TRIMESTER (HCC): ICD-10-CM

## 2020-09-28 DIAGNOSIS — Z3A.15 15 WEEKS GESTATION OF PREGNANCY: ICD-10-CM

## 2020-09-28 DIAGNOSIS — O99.211 OBESITY AFFECTING PREGNANCY IN FIRST TRIMESTER: ICD-10-CM

## 2020-09-28 DIAGNOSIS — O24.112 PREGNANCY COMPLICATED BY PRE-EXISTING TYPE 2 DIABETES IN SECOND TRIMESTER: Primary | ICD-10-CM

## 2020-09-28 DIAGNOSIS — Z3A.13 13 WEEKS GESTATION OF PREGNANCY: ICD-10-CM

## 2020-09-28 PROCEDURE — G2012 BRIEF CHECK IN BY MD/QHP: HCPCS | Performed by: NURSE PRACTITIONER

## 2020-09-28 NOTE — PATIENT INSTRUCTIONS
-Continue Lantus 38 units at 8 PM daily   -Increase Humalog to 6 units before meals   -Hold Metformin for now due to increase in AST/ALT noted since starting Metformin   -Repeat CMP with next labs due in 10/28/20    -Continue GDM diet and testing glucose  -Report on Thursday, 10/1/20 or sooner if needed   -Always have glucose available for hypoglycemia, use 15 by 15 rule  -Walk up to 30 minutes a day if no restrictions from your OB   -Continue follow up with OB and MFM as recommended   -Keep ultrasound as scheduled  -A1c 6 9% improved, aim for less than 6%  -Follow up in 4 to 6 weeks

## 2020-09-28 NOTE — TELEPHONE ENCOUNTER
rec'vd from pt's mother stating that she left a message with Darleen Price also, that [address] told her [place] doesn't cover Humalog, and pt needs a different prescription

## 2020-09-28 NOTE — PROGRESS NOTES
Virtual Regular Visit      Assessment/Plan:    Problem List Items Addressed This Visit        Endocrine    Pregnancy complicated by pre-existing type 2 diabetes in second trimester - Primary       Other    15 weeks gestation of pregnancy    Obesity affecting pregnancy in second trimester    BMI 45 0-49 9, adult ([facility] )      Other Visit Diagnoses     Pre-existing type 2 diabetes mellitus with hyperglycemia during pregnancy in first trimester ([facility] )        13 weeks gestation of pregnancy        Obesity affecting pregnancy in first trimester            -Continue Lantus 38 units at 8 PM daily   -Increase Humalog to 6 units before meals   -Hold Metformin for now due to increase in AST/ALT noted since starting Metformin   -Repeat CMP with next labs due in 10/28/20    -Continue GDM diet and testing glucose  -Report on Thursday, 10/1/20 or sooner if needed   -Always have glucose available for hypoglycemia, use 15 by 15 rule  -Walk up to 30 minutes a day if no restrictions from your OB   -Continue follow up with OB and MFM as recommended   -Keep ultrasound as scheduled  -A1c 6 9% improved, aim for less than 6%  -Follow up in 4 to 6 weeks  Reason for visit is T2DM  Chief Complaint   Patient presents with    Virtual Regular Visit    Diabetes Type 2        Encounter provider Sia Arias [facility]    Provider located at [address] [place] [address]  [address] [facility] [id number] [phone]      Recent Visits  Date Type Provider Dept   09/25/20 1401 [address]   Showing recent visits within past 7 days and meeting all other requirements     Today's Visits  Date Type Provider Dept   09/28/20 Telephone Lv Mejia [address]   09/28/20 Telemedicine Sia Arias [facility] [address]   Showing today's visits and meeting all other requirements     Future Appointments  No visits were found meeting these conditions     Showing future appointments within next 150 days and meeting all other requirements        The patient was identified by name and date of birth  Alejandrina Arauz was informed that this is a telemedicine visit and that the visit is being conducted through blinkbox music  My office door was closed  No one else was in the room  She acknowledged consent and understanding of privacy and security of the video platform  The patient has agreed to participate and understands they can discontinue the visit at any time  Patient is aware this is a billable service  Subjective  Alejandrina Arauz is a 26 y o  female, pre-existing T2DM, currently on Lantus 38 units at 8 PM daily, Humalog 4 units before meals and Metformin 1000 mg twice a day with meals   Follow up T2DM  Testing fasting and 2 hours post start of each meal  Following GDM diet  Past Medical History:   Diagnosis Date    Chlamydia     Diabetes ([facility] ) 03/2020    Mentally challenged     Migraines     Neurological disorder     Schizoaffective disorder ([facility] )        Past Surgical History:   Procedure Laterality Date    TYMPANOSTOMY TUBE PLACEMENT      WISDOM TOOTH EXTRACTION         Current Outpatient Medications   Medication Sig Dispense Refill    Blood Glucose Monitoring Suppl (OneTouch Verio Flex System) w/Device KIT Dispense 1 kit per insurance formulary  1 kit 0    insulin glargine (Lantus SoloStar) 100 units/mL injection pen Inject 30 Units under the skin daily at bedtime At 7 PM daily  To be titrated  (Patient taking differently: Inject 38 Units under the skin daily at bedtime At 7 PM daily  To be titrated ) 5 pen 0    insulin lispro (HumaLOG KwikPen) 100 units/mL injection pen Inject 4 Units under the skin 3 (three) times a day with meals 5 pen 0    Insulin Pen Needle 31G X 5 MM MISC Inject under the skin daily at bedtime Use one a day or as directed   100 each 1    metFORMIN (GLUCOPHAGE) 500 mg tablet Take 1 tablet with breakfast and 2 tablets with dinner  To be titrated to total 2000 mg a day  (Patient taking differently: 1,000 mg 2 (two) times a day with meals Take 2 tablets with breakfast and 2 tablets with dinner  To be titrated to total 2000 mg a day ) 120 tablet 3    OneTouch Delica Lancets 14D MISC Use 4 a day or as directed  100 each 3    OneTouch Verio test strip Test 4 times a day and as instructed  Gestational diabetes  100 each 3    Prenatal Vit-Fe Fumarate-FA (PRENATAL VITAMIN PO) Take 1 tablet by mouth daily       No current facility-administered medications for this visit  Allergies   Allergen Reactions    Cefaclor Rash     Other reaction(s): Itching       Review of Systems   Constitutional: Negative for fatigue and fever  Eyes: Negative for visual disturbance  Respiratory: Negative for cough and shortness of breath  Cardiovascular: Negative for chest pain, palpitations and leg swelling  Gastrointestinal: Negative for diarrhea and vomiting  Endocrine: Negative for polydipsia, polyphagia and polyuria  Genitourinary: Negative for difficulty urinating and vaginal bleeding  Neurological: Negative for headaches  Psychiatric/Behavioral: Negative for sleep disturbance  Video Exam  Refer to glucose flowsheet  Vitals:    09/28/20 1032   Weight: 125 kg (275 lb)   Height: 5' 5" (1 651 m)       Physical Exam   It was my intent to perform this visit via video technology but the patient was not able to do a video connection so the visit was completed via audio telephone only  I spent 29 minutes directly with the patient during this visit  VIRTUAL VISIT DISCLAIMER    Dar Fontenot acknowledges that she has consented to an online visit or consultation   She understands that the online visit is based solely on information provided by her, and that, in the absence of a face-to-face physical evaluation by the physician, the diagnosis she receives is both limited and provisional in terms of accuracy and completeness  This is not intended to replace a full medical face-to-face evaluation by the physician  Elisha Sahu understands and accepts these terms

## 2020-10-06 DIAGNOSIS — O24.112 PREGNANCY COMPLICATED BY PRE-EXISTING TYPE 2 DIABETES IN SECOND TRIMESTER: ICD-10-CM

## 2020-10-06 DIAGNOSIS — Z3A.15 15 WEEKS GESTATION OF PREGNANCY: ICD-10-CM

## 2020-10-06 DIAGNOSIS — O99.212 OBESITY AFFECTING PREGNANCY IN SECOND TRIMESTER: ICD-10-CM

## 2020-10-09 ENCOUNTER — TELEPHONE (OUTPATIENT)
Dept: PERINATAL CARE | Facility: CLINIC | Age: 30
End: 2020-10-09

## 2020-10-12 ENCOUNTER — TELEPHONE (OUTPATIENT)
Dept: OBGYN CLINIC | Facility: CLINIC | Age: 30
End: 2020-10-12

## 2020-10-13 ENCOUNTER — LAB (OUTPATIENT)
Dept: LAB | Facility: HOSPITAL | Age: 30
End: 2020-10-13
Attending: OBSTETRICS & GYNECOLOGY
Payer: MEDICARE

## 2020-10-13 ENCOUNTER — OFFICE VISIT (OUTPATIENT)
Dept: OBGYN CLINIC | Facility: CLINIC | Age: 30
End: 2020-10-13

## 2020-10-13 ENCOUNTER — ANESTHESIA EVENT (OUTPATIENT)
Dept: PERIOP | Facility: AMBULARY SURGERY CENTER | Age: 30
End: 2020-10-13
Payer: MEDICARE

## 2020-10-13 ENCOUNTER — TRANSCRIBE ORDERS (OUTPATIENT)
Dept: LAB | Facility: HOSPITAL | Age: 30
End: 2020-10-13

## 2020-10-13 VITALS
BODY MASS INDEX: 46.28 KG/M2 | DIASTOLIC BLOOD PRESSURE: 74 MMHG | HEART RATE: 116 BPM | SYSTOLIC BLOOD PRESSURE: 129 MMHG | TEMPERATURE: 97.8 F | HEIGHT: 65 IN | WEIGHT: 277.8 LBS

## 2020-10-13 DIAGNOSIS — O24.112 PRE-EXISTING TYPE 2 DIABETES MELLITUS IN PREGNANCY IN SECOND TRIMESTER: Primary | ICD-10-CM

## 2020-10-13 DIAGNOSIS — Z01.818 PRE-OP TESTING: ICD-10-CM

## 2020-10-13 DIAGNOSIS — Z3A.15 15 WEEKS GESTATION OF PREGNANCY: ICD-10-CM

## 2020-10-13 DIAGNOSIS — O02.1 IUFD AT LESS THAN 20 WEEKS OF GESTATION: Primary | ICD-10-CM

## 2020-10-13 DIAGNOSIS — Z01.818 PRE-OP EXAM: ICD-10-CM

## 2020-10-13 DIAGNOSIS — O02.1 FETAL DEMISE BEFORE 20 WEEKS WITH RETENTION OF DEAD FETUS: ICD-10-CM

## 2020-10-13 DIAGNOSIS — O99.212 OBESITY COMPLICATING PREGNANCY IN SECOND TRIMESTER: ICD-10-CM

## 2020-10-13 LAB
ABO GROUP BLD: NORMAL
BASOPHILS # BLD AUTO: 0.02 THOUSANDS/ΜL (ref 0–0.1)
BASOPHILS NFR BLD AUTO: 0 % (ref 0–1)
BLD GP AB SCN SERPL QL: NEGATIVE
EOSINOPHIL # BLD AUTO: 0.09 THOUSAND/ΜL (ref 0–0.61)
EOSINOPHIL NFR BLD AUTO: 1 % (ref 0–6)
ERYTHROCYTE [DISTWIDTH] IN BLOOD BY AUTOMATED COUNT: 13.8 % (ref 11.6–15.1)
HCT VFR BLD AUTO: 38.5 % (ref 34.8–46.1)
HGB BLD-MCNC: 12.8 G/DL (ref 11.5–15.4)
IMM GRANULOCYTES # BLD AUTO: 0.04 THOUSAND/UL (ref 0–0.2)
IMM GRANULOCYTES NFR BLD AUTO: 1 % (ref 0–2)
LYMPHOCYTES # BLD AUTO: 1.55 THOUSANDS/ΜL (ref 0.6–4.47)
LYMPHOCYTES NFR BLD AUTO: 19 % (ref 14–44)
MCH RBC QN AUTO: 26.9 PG (ref 26.8–34.3)
MCHC RBC AUTO-ENTMCNC: 33.2 G/DL (ref 31.4–37.4)
MCV RBC AUTO: 81 FL (ref 82–98)
MONOCYTES # BLD AUTO: 0.37 THOUSAND/ΜL (ref 0.17–1.22)
MONOCYTES NFR BLD AUTO: 4 % (ref 4–12)
NEUTROPHILS # BLD AUTO: 6.31 THOUSANDS/ΜL (ref 1.85–7.62)
NEUTS SEG NFR BLD AUTO: 75 % (ref 43–75)
NRBC BLD AUTO-RTO: 0 /100 WBCS
PLATELET # BLD AUTO: 207 THOUSANDS/UL (ref 149–390)
PMV BLD AUTO: 11.4 FL (ref 8.9–12.7)
RBC # BLD AUTO: 4.75 MILLION/UL (ref 3.81–5.12)
RH BLD: POSITIVE
SPECIMEN EXPIRATION DATE: NORMAL
WBC # BLD AUTO: 8.38 THOUSAND/UL (ref 4.31–10.16)

## 2020-10-13 PROCEDURE — 36415 COLL VENOUS BLD VENIPUNCTURE: CPT

## 2020-10-13 PROCEDURE — 85025 COMPLETE CBC W/AUTO DIFF WBC: CPT

## 2020-10-13 PROCEDURE — 86850 RBC ANTIBODY SCREEN: CPT

## 2020-10-13 PROCEDURE — 86901 BLOOD TYPING SEROLOGIC RH(D): CPT

## 2020-10-13 PROCEDURE — 86900 BLOOD TYPING SEROLOGIC ABO: CPT

## 2020-10-13 PROCEDURE — 99204 OFFICE O/P NEW MOD 45 MIN: CPT | Performed by: OBSTETRICS & GYNECOLOGY

## 2020-10-13 RX ORDER — SODIUM CHLORIDE 9 MG/ML
125 INJECTION, SOLUTION INTRAVENOUS CONTINUOUS
Status: CANCELLED | OUTPATIENT
Start: 2020-10-13

## 2020-10-13 RX ORDER — MISOPROSTOL 100 UG/1
400 TABLET ORAL ONCE
Status: CANCELLED | OUTPATIENT
Start: 2020-10-13

## 2020-10-13 RX ORDER — DOXYCYCLINE HYCLATE 50 MG/1
200 CAPSULE ORAL ONCE
Status: CANCELLED | OUTPATIENT
Start: 2020-10-13

## 2020-10-14 ENCOUNTER — ANESTHESIA (OUTPATIENT)
Dept: PERIOP | Facility: AMBULARY SURGERY CENTER | Age: 30
End: 2020-10-14
Payer: MEDICARE

## 2020-10-14 ENCOUNTER — HOSPITAL ENCOUNTER (OUTPATIENT)
Facility: AMBULARY SURGERY CENTER | Age: 30
Setting detail: OUTPATIENT SURGERY
Discharge: HOME | End: 2020-10-14
Attending: OBSTETRICS & GYNECOLOGY | Admitting: OBSTETRICS & GYNECOLOGY
Payer: MEDICARE

## 2020-10-14 VITALS
BODY MASS INDEX: 46.15 KG/M2 | RESPIRATION RATE: 18 BRPM | TEMPERATURE: 97.4 F | WEIGHT: 277 LBS | SYSTOLIC BLOOD PRESSURE: 108 MMHG | DIASTOLIC BLOOD PRESSURE: 65 MMHG | OXYGEN SATURATION: 95 % | HEIGHT: 65 IN | HEART RATE: 95 BPM

## 2020-10-14 VITALS — HEART RATE: 108 BPM

## 2020-10-14 DIAGNOSIS — O02.1 FETAL DEMISE BEFORE 20 WEEKS WITH RETENTION OF DEAD FETUS: ICD-10-CM

## 2020-10-14 LAB — GLUCOSE SERPL-MCNC: 74 MG/DL (ref 65–140)

## 2020-10-14 PROCEDURE — 88305 TISSUE EXAM BY PATHOLOGIST: CPT | Performed by: PATHOLOGY

## 2020-10-14 PROCEDURE — 82948 REAGENT STRIP/BLOOD GLUCOSE: CPT

## 2020-10-14 PROCEDURE — 59821 TREATMENT OF MISCARRIAGE: CPT | Performed by: OBSTETRICS & GYNECOLOGY

## 2020-10-14 RX ORDER — MIDAZOLAM HYDROCHLORIDE 2 MG/2ML
INJECTION, SOLUTION INTRAMUSCULAR; INTRAVENOUS AS NEEDED
Status: DISCONTINUED | OUTPATIENT
Start: 2020-10-14 | End: 2020-10-14

## 2020-10-14 RX ORDER — LIDOCAINE HYDROCHLORIDE 10 MG/ML
0.5 INJECTION, SOLUTION EPIDURAL; INFILTRATION; INTRACAUDAL; PERINEURAL ONCE AS NEEDED
Status: DISCONTINUED | OUTPATIENT
Start: 2020-10-14 | End: 2020-10-14

## 2020-10-14 RX ORDER — FENTANYL CITRATE/PF 50 MCG/ML
25 SYRINGE (ML) INJECTION
Status: DISCONTINUED | OUTPATIENT
Start: 2020-10-14 | End: 2020-10-14 | Stop reason: HOSPADM

## 2020-10-14 RX ORDER — DEXAMETHASONE SODIUM PHOSPHATE 4 MG/ML
INJECTION, SOLUTION INTRA-ARTICULAR; INTRALESIONAL; INTRAMUSCULAR; INTRAVENOUS; SOFT TISSUE AS NEEDED
Status: DISCONTINUED | OUTPATIENT
Start: 2020-10-14 | End: 2020-10-14

## 2020-10-14 RX ORDER — ACETAMINOPHEN 325 MG/1
650 TABLET ORAL EVERY 6 HOURS PRN
Status: DISCONTINUED | OUTPATIENT
Start: 2020-10-14 | End: 2020-10-14 | Stop reason: HOSPADM

## 2020-10-14 RX ORDER — MAGNESIUM HYDROXIDE 1200 MG/15ML
LIQUID ORAL AS NEEDED
Status: DISCONTINUED | OUTPATIENT
Start: 2020-10-14 | End: 2020-10-14 | Stop reason: HOSPADM

## 2020-10-14 RX ORDER — FENTANYL CITRATE 50 UG/ML
INJECTION, SOLUTION INTRAMUSCULAR; INTRAVENOUS AS NEEDED
Status: DISCONTINUED | OUTPATIENT
Start: 2020-10-14 | End: 2020-10-14

## 2020-10-14 RX ORDER — IBUPROFEN 600 MG/1
600 TABLET ORAL EVERY 6 HOURS PRN
Status: DISCONTINUED | OUTPATIENT
Start: 2020-10-14 | End: 2020-10-14 | Stop reason: HOSPADM

## 2020-10-14 RX ORDER — MISOPROSTOL 100 UG/1
400 TABLET ORAL ONCE
Status: COMPLETED | OUTPATIENT
Start: 2020-10-14 | End: 2020-10-14

## 2020-10-14 RX ORDER — MEDROXYPROGESTERONE ACETATE 150 MG/ML
150 INJECTION, SUSPENSION INTRAMUSCULAR ONCE
Status: COMPLETED | OUTPATIENT
Start: 2020-10-14 | End: 2020-10-14

## 2020-10-14 RX ORDER — DOXYCYCLINE HYCLATE 100 MG/1
200 CAPSULE ORAL ONCE
Status: COMPLETED | OUTPATIENT
Start: 2020-10-14 | End: 2020-10-14

## 2020-10-14 RX ORDER — ONDANSETRON 2 MG/ML
INJECTION INTRAMUSCULAR; INTRAVENOUS AS NEEDED
Status: DISCONTINUED | OUTPATIENT
Start: 2020-10-14 | End: 2020-10-14

## 2020-10-14 RX ORDER — METOCLOPRAMIDE HYDROCHLORIDE 5 MG/ML
10 INJECTION INTRAMUSCULAR; INTRAVENOUS ONCE AS NEEDED
Status: DISCONTINUED | OUTPATIENT
Start: 2020-10-14 | End: 2020-10-14 | Stop reason: HOSPADM

## 2020-10-14 RX ORDER — ONDANSETRON 2 MG/ML
4 INJECTION INTRAMUSCULAR; INTRAVENOUS EVERY 6 HOURS PRN
Status: DISCONTINUED | OUTPATIENT
Start: 2020-10-14 | End: 2020-10-14 | Stop reason: HOSPADM

## 2020-10-14 RX ORDER — LIDOCAINE HYDROCHLORIDE 10 MG/ML
INJECTION, SOLUTION EPIDURAL; INFILTRATION; INTRACAUDAL; PERINEURAL AS NEEDED
Status: DISCONTINUED | OUTPATIENT
Start: 2020-10-14 | End: 2020-10-14

## 2020-10-14 RX ORDER — PROPOFOL 10 MG/ML
INJECTION, EMULSION INTRAVENOUS AS NEEDED
Status: DISCONTINUED | OUTPATIENT
Start: 2020-10-14 | End: 2020-10-14

## 2020-10-14 RX ORDER — ONDANSETRON 2 MG/ML
4 INJECTION INTRAMUSCULAR; INTRAVENOUS ONCE AS NEEDED
Status: DISCONTINUED | OUTPATIENT
Start: 2020-10-14 | End: 2020-10-14 | Stop reason: HOSPADM

## 2020-10-14 RX ORDER — SODIUM CHLORIDE 9 MG/ML
125 INJECTION, SOLUTION INTRAVENOUS CONTINUOUS
Status: DISCONTINUED | OUTPATIENT
Start: 2020-10-14 | End: 2020-10-14 | Stop reason: HOSPADM

## 2020-10-14 RX ORDER — SODIUM CHLORIDE, SODIUM LACTATE, POTASSIUM CHLORIDE, CALCIUM CHLORIDE 600; 310; 30; 20 MG/100ML; MG/100ML; MG/100ML; MG/100ML
125 INJECTION, SOLUTION INTRAVENOUS CONTINUOUS
Status: DISCONTINUED | OUTPATIENT
Start: 2020-10-14 | End: 2020-10-14 | Stop reason: HOSPADM

## 2020-10-14 RX ORDER — OXYCODONE HYDROCHLORIDE 5 MG/1
5 TABLET ORAL EVERY 4 HOURS PRN
Status: DISCONTINUED | OUTPATIENT
Start: 2020-10-14 | End: 2020-10-14 | Stop reason: HOSPADM

## 2020-10-14 RX ORDER — KETOROLAC TROMETHAMINE 30 MG/ML
INJECTION, SOLUTION INTRAMUSCULAR; INTRAVENOUS AS NEEDED
Status: DISCONTINUED | OUTPATIENT
Start: 2020-10-14 | End: 2020-10-14

## 2020-10-14 RX ADMIN — LIDOCAINE HYDROCHLORIDE 50 MG: 10 INJECTION, SOLUTION EPIDURAL; INFILTRATION; INTRACAUDAL at 11:56

## 2020-10-14 RX ADMIN — MIDAZOLAM HYDROCHLORIDE 2 MG: 1 INJECTION, SOLUTION INTRAMUSCULAR; INTRAVENOUS at 11:46

## 2020-10-14 RX ADMIN — PROPOFOL 200 MG: 10 INJECTION, EMULSION INTRAVENOUS at 11:56

## 2020-10-14 RX ADMIN — FENTANYL CITRATE 25 MCG: 50 INJECTION, SOLUTION INTRAMUSCULAR; INTRAVENOUS at 12:04

## 2020-10-14 RX ADMIN — MISOPROSTOL 400 MCG: 100 TABLET ORAL at 10:32

## 2020-10-14 RX ADMIN — FENTANYL CITRATE 25 MCG: 50 INJECTION, SOLUTION INTRAMUSCULAR; INTRAVENOUS at 12:08

## 2020-10-14 RX ADMIN — ONDANSETRON 4 MG: 2 INJECTION INTRAMUSCULAR; INTRAVENOUS at 12:09

## 2020-10-14 RX ADMIN — FENTANYL CITRATE 25 MCG: 50 INJECTION, SOLUTION INTRAMUSCULAR; INTRAVENOUS at 12:06

## 2020-10-14 RX ADMIN — KETOROLAC TROMETHAMINE 30 MG: 30 INJECTION, SOLUTION INTRAMUSCULAR at 12:51

## 2020-10-14 RX ADMIN — PROPOFOL 100 MG: 10 INJECTION, EMULSION INTRAVENOUS at 11:57

## 2020-10-14 RX ADMIN — DEXAMETHASONE SODIUM PHOSPHATE 4 MG: 4 INJECTION, SOLUTION INTRA-ARTICULAR; INTRALESIONAL; INTRAMUSCULAR; INTRAVENOUS; SOFT TISSUE at 12:09

## 2020-10-14 RX ADMIN — MEDROXYPROGESTERONE ACETATE 150 MG: 150 INJECTION, SUSPENSION INTRAMUSCULAR at 14:08

## 2020-10-14 RX ADMIN — SODIUM CHLORIDE, SODIUM LACTATE, POTASSIUM CHLORIDE, AND CALCIUM CHLORIDE 125 ML/HR: .6; .31; .03; .02 INJECTION, SOLUTION INTRAVENOUS at 10:37

## 2020-10-14 RX ADMIN — FENTANYL CITRATE 25 MCG: 50 INJECTION, SOLUTION INTRAMUSCULAR; INTRAVENOUS at 12:02

## 2020-10-14 RX ADMIN — DOXYCYCLINE 200 MG: 100 CAPSULE ORAL at 10:22

## 2020-10-28 ENCOUNTER — TELEPHONE (OUTPATIENT)
Dept: OBGYN CLINIC | Facility: CLINIC | Age: 30
End: 2020-10-28

## 2020-10-28 ENCOUNTER — OFFICE VISIT (OUTPATIENT)
Dept: OBGYN CLINIC | Facility: CLINIC | Age: 30
End: 2020-10-28

## 2020-10-28 ENCOUNTER — LAB (OUTPATIENT)
Dept: LAB | Facility: HOSPITAL | Age: 30
End: 2020-10-28
Payer: MEDICARE

## 2020-10-28 VITALS
HEART RATE: 90 BPM | BODY MASS INDEX: 42.69 KG/M2 | WEIGHT: 272 LBS | TEMPERATURE: 97.4 F | DIASTOLIC BLOOD PRESSURE: 78 MMHG | SYSTOLIC BLOOD PRESSURE: 114 MMHG | HEIGHT: 67 IN

## 2020-10-28 DIAGNOSIS — O99.211 OBESITY AFFECTING PREGNANCY IN FIRST TRIMESTER: ICD-10-CM

## 2020-10-28 DIAGNOSIS — Z3A.15 15 WEEKS GESTATION OF PREGNANCY: ICD-10-CM

## 2020-10-28 DIAGNOSIS — O99.212 OBESITY COMPLICATING PREGNANCY IN SECOND TRIMESTER: ICD-10-CM

## 2020-10-28 DIAGNOSIS — O24.111 PRE-EXISTING TYPE 2 DIABETES MELLITUS WITH HYPERGLYCEMIA DURING PREGNANCY IN FIRST TRIMESTER (HCC): ICD-10-CM

## 2020-10-28 DIAGNOSIS — O02.1 IUFD AT LESS THAN 20 WEEKS OF GESTATION: Primary | ICD-10-CM

## 2020-10-28 DIAGNOSIS — Z98.890 STATUS POST DILATION AND CURETTAGE: ICD-10-CM

## 2020-10-28 DIAGNOSIS — E11.65 PRE-EXISTING TYPE 2 DIABETES MELLITUS WITH HYPERGLYCEMIA DURING PREGNANCY IN FIRST TRIMESTER (HCC): ICD-10-CM

## 2020-10-28 DIAGNOSIS — O24.112 PRE-EXISTING TYPE 2 DIABETES MELLITUS IN PREGNANCY IN SECOND TRIMESTER: ICD-10-CM

## 2020-10-28 DIAGNOSIS — Z3A.13 13 WEEKS GESTATION OF PREGNANCY: ICD-10-CM

## 2020-10-28 LAB
ALBUMIN SERPL BCP-MCNC: 3.4 G/DL (ref 3.5–5)
ALP SERPL-CCNC: 93 U/L (ref 46–116)
ALT SERPL W P-5'-P-CCNC: 25 U/L (ref 12–78)
ANION GAP SERPL CALCULATED.3IONS-SCNC: 6 MMOL/L (ref 4–13)
AST SERPL W P-5'-P-CCNC: 8 U/L (ref 5–45)
BILIRUB SERPL-MCNC: 0.54 MG/DL (ref 0.2–1)
BUN SERPL-MCNC: 8 MG/DL (ref 5–25)
CALCIUM ALBUM COR SERPL-MCNC: 9.5 MG/DL (ref 8.3–10.1)
CALCIUM SERPL-MCNC: 9 MG/DL (ref 8.3–10.1)
CHLORIDE SERPL-SCNC: 111 MMOL/L (ref 100–108)
CO2 SERPL-SCNC: 23 MMOL/L (ref 21–32)
CREAT SERPL-MCNC: 0.66 MG/DL (ref 0.6–1.3)
EST. AVERAGE GLUCOSE BLD GHB EST-MCNC: 108 MG/DL
GFR SERPL CREATININE-BSD FRML MDRD: 117 ML/MIN/1.73SQ M
GLUCOSE P FAST SERPL-MCNC: 125 MG/DL (ref 65–99)
HBA1C MFR BLD: 5.4 %
POTASSIUM SERPL-SCNC: 4 MMOL/L (ref 3.5–5.3)
PROT SERPL-MCNC: 7.5 G/DL (ref 6.4–8.2)
SODIUM SERPL-SCNC: 140 MMOL/L (ref 136–145)

## 2020-10-28 PROCEDURE — 99213 OFFICE O/P EST LOW 20 MIN: CPT | Performed by: OBSTETRICS & GYNECOLOGY

## 2020-10-28 PROCEDURE — 83036 HEMOGLOBIN GLYCOSYLATED A1C: CPT

## 2020-10-28 PROCEDURE — 80053 COMPREHEN METABOLIC PANEL: CPT

## 2020-10-28 PROCEDURE — 36415 COLL VENOUS BLD VENIPUNCTURE: CPT

## 2020-11-19 LAB
CHROMOSOME BLD/T: NORMAL
CLINICAL CYTOGENETICIST SPEC: NORMAL
DIAGNOSTIC IMP SPEC-IMP: NORMAL
SL AMB # OF GENOTYPING TARGETS: NORMAL
SL AMB ARRAY TYPE: NORMAL
SPECIMEN SOURCE: NORMAL

## 2021-04-29 ENCOUNTER — IMMUNIZATIONS (OUTPATIENT)
Dept: FAMILY MEDICINE CLINIC | Facility: HOSPITAL | Age: 31
End: 2021-04-29

## 2021-04-29 DIAGNOSIS — Z23 ENCOUNTER FOR IMMUNIZATION: Primary | ICD-10-CM

## 2021-04-29 PROCEDURE — 91300 SARS-COV-2 / COVID-19 MRNA VACCINE (PFIZER-BIONTECH) 30 MCG: CPT

## 2021-04-29 PROCEDURE — 0001A SARS-COV-2 / COVID-19 MRNA VACCINE (PFIZER-BIONTECH) 30 MCG: CPT

## 2021-05-24 ENCOUNTER — IMMUNIZATIONS (OUTPATIENT)
Dept: FAMILY MEDICINE CLINIC | Facility: HOSPITAL | Age: 31
End: 2021-05-24

## 2021-05-24 DIAGNOSIS — Z23 ENCOUNTER FOR IMMUNIZATION: Primary | ICD-10-CM

## 2021-05-24 PROCEDURE — 0002A SARS-COV-2 / COVID-19 MRNA VACCINE (PFIZER-BIONTECH) 30 MCG: CPT

## 2021-05-24 PROCEDURE — 91300 SARS-COV-2 / COVID-19 MRNA VACCINE (PFIZER-BIONTECH) 30 MCG: CPT

## 2022-04-11 ENCOUNTER — OFFICE VISIT (OUTPATIENT)
Dept: OBGYN CLINIC | Facility: CLINIC | Age: 32
End: 2022-04-11
Payer: COMMERCIAL

## 2022-04-11 VITALS
DIASTOLIC BLOOD PRESSURE: 84 MMHG | BODY MASS INDEX: 43.22 KG/M2 | SYSTOLIC BLOOD PRESSURE: 136 MMHG | HEIGHT: 67 IN | WEIGHT: 275.4 LBS

## 2022-04-11 DIAGNOSIS — Z30.42 ON DEPO-PROVERA FOR CONTRACEPTION: ICD-10-CM

## 2022-04-11 DIAGNOSIS — E11.65 TYPE 2 DIABETES MELLITUS WITH HYPERGLYCEMIA, WITH LONG-TERM CURRENT USE OF INSULIN (HCC): ICD-10-CM

## 2022-04-11 DIAGNOSIS — Z01.419 ENCOUNTER FOR ANNUAL ROUTINE GYNECOLOGICAL EXAMINATION: Primary | ICD-10-CM

## 2022-04-11 DIAGNOSIS — Z79.4 TYPE 2 DIABETES MELLITUS WITH HYPERGLYCEMIA, WITH LONG-TERM CURRENT USE OF INSULIN (HCC): ICD-10-CM

## 2022-04-11 DIAGNOSIS — Z12.4 CERVICAL CANCER SCREENING: ICD-10-CM

## 2022-04-11 PROCEDURE — G0101 CA SCREEN;PELVIC/BREAST EXAM: HCPCS | Performed by: OBSTETRICS & GYNECOLOGY

## 2022-04-11 RX ORDER — MEDROXYPROGESTERONE ACETATE 150 MG/ML
150 INJECTION, SUSPENSION INTRAMUSCULAR
COMMUNITY

## 2022-04-11 RX ORDER — MEDROXYPROGESTERONE ACETATE 150 MG/ML
150 INJECTION, SUSPENSION INTRAMUSCULAR
Qty: 1 ML | Refills: 3 | Status: SHIPPED | OUTPATIENT
Start: 2022-04-11 | End: 2022-07-10

## 2022-04-11 RX ORDER — LISINOPRIL 5 MG/1
5 TABLET ORAL DAILY
COMMUNITY

## 2022-04-11 RX ORDER — TRIAMCINOLONE ACETONIDE 1 MG/G
CREAM TOPICAL 2 TIMES DAILY
COMMUNITY
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
## 2022-04-16 LAB
CYTOLOGIST CVX/VAG CYTO: NORMAL
DX ICD CODE: NORMAL
HPV I/H RISK 4 DNA CVX QL PROBE+SIG AMP: NEGATIVE
Lab: NORMAL
OTHER STN SPEC: NORMAL
PATH REPORT.FINAL DX SPEC: NORMAL
SL AMB NOTE:: NORMAL
SL AMB SPECIMEN ADEQUACY: NORMAL
SL AMB TEST METHODOLOGY: NORMAL

## 2022-06-29 ENCOUNTER — OFFICE VISIT (OUTPATIENT)
Dept: OBGYN CLINIC | Facility: CLINIC | Age: 32
End: 2022-06-29
Payer: COMMERCIAL

## 2022-06-29 DIAGNOSIS — Z30.42 DEPO-PROVERA CONTRACEPTIVE STATUS: Primary | ICD-10-CM

## 2022-06-29 PROCEDURE — 96372 THER/PROPH/DIAG INJ SC/IM: CPT | Performed by: OBSTETRICS & GYNECOLOGY

## 2022-06-29 RX ADMIN — MEDROXYPROGESTERONE ACETATE 150 MG: 150 INJECTION, SUSPENSION INTRAMUSCULAR at 10:00

## 2022-07-01 ENCOUNTER — TELEPHONE (OUTPATIENT)
Dept: OBGYN CLINIC | Facility: CLINIC | Age: 32
End: 2022-07-01

## 2022-07-01 RX ORDER — MEDROXYPROGESTERONE ACETATE 150 MG/ML
150 INJECTION, SUSPENSION INTRAMUSCULAR ONCE
Status: COMPLETED | OUTPATIENT
Start: 2022-07-01 | End: 2022-06-29

## 2022-07-01 NOTE — TELEPHONE ENCOUNTER
Crystal Granados arrived on 06/29/2022 at 10:00 am in the [facility] office  Please sign off on her order  The ordering provider is Dr Evie العراقي who is not in the office today  Thank you

## 2022-07-01 NOTE — PROGRESS NOTES
Pt was here for depo injection  Depo was given on 6/29/22 10:00 am given left deltoid  Pt will return in 12 weeks        Susan 47 [id number]    Lot # [id number]    EXP 11/2023

## 2022-09-14 ENCOUNTER — OFFICE VISIT (OUTPATIENT)
Dept: GYNECOLOGY | Facility: CLINIC | Age: 32
End: 2022-09-14
Payer: COMMERCIAL

## 2022-09-14 ENCOUNTER — TELEPHONE (OUTPATIENT)
Dept: GYNECOLOGY | Facility: CLINIC | Age: 32
End: 2022-09-14

## 2022-09-14 VITALS
WEIGHT: 265.4 LBS | HEIGHT: 67 IN | BODY MASS INDEX: 41.65 KG/M2 | DIASTOLIC BLOOD PRESSURE: 80 MMHG | SYSTOLIC BLOOD PRESSURE: 126 MMHG

## 2022-09-14 DIAGNOSIS — Z30.42 ENCOUNTER FOR SURVEILLANCE OF INJECTABLE CONTRACEPTIVE: Primary | ICD-10-CM

## 2022-09-14 PROCEDURE — 96372 THER/PROPH/DIAG INJ SC/IM: CPT | Performed by: OBSTETRICS & GYNECOLOGY

## 2022-09-14 RX ORDER — MEDROXYPROGESTERONE ACETATE 150 MG/ML
150 INJECTION, SUSPENSION INTRAMUSCULAR ONCE
Status: COMPLETED | OUTPATIENT
Start: 2022-09-14 | End: 2022-09-14

## 2022-09-14 RX ADMIN — MEDROXYPROGESTERONE ACETATE 150 MG: 150 INJECTION, SUSPENSION INTRAMUSCULAR at 10:40

## 2022-09-14 NOTE — TELEPHONE ENCOUNTER
Kamilla Pop arrived on 9/14/22 at 10:00am in the [place] office. Please sign off on her order. The ordering provider is Dr. Rachael Schaefer who is in the office today. Thank you.

## 2022-09-14 NOTE — PROGRESS NOTES
Patient was seen today for Depo Injection at 10:00 am   Given in right deltoid  Patient to return in 12 weeks        Bartolo Davis 47 [id number]    LOT [id number]    EXP 05/31/2024

## 2022-12-07 ENCOUNTER — OFFICE VISIT (OUTPATIENT)
Dept: GYNECOLOGY | Facility: CLINIC | Age: 32
End: 2022-12-07

## 2022-12-07 ENCOUNTER — TELEPHONE (OUTPATIENT)
Dept: GYNECOLOGY | Facility: CLINIC | Age: 32
End: 2022-12-07

## 2022-12-07 DIAGNOSIS — Z30.42 ENCOUNTER FOR DEPO-PROVERA CONTRACEPTION: Primary | ICD-10-CM

## 2022-12-07 RX ORDER — MEDROXYPROGESTERONE ACETATE 150 MG/ML
150 INJECTION, SUSPENSION INTRAMUSCULAR ONCE
Status: COMPLETED | OUTPATIENT
Start: 2022-12-07 | End: 2022-12-07

## 2022-12-07 RX ADMIN — MEDROXYPROGESTERONE ACETATE 150 MG: 150 INJECTION, SUSPENSION INTRAMUSCULAR at 13:17

## 2022-12-07 NOTE — TELEPHONE ENCOUNTER
Sowmya Loving arrived on 12/7/22 at 8:30am in the [facility]  office. Please sign off on her order. The ordering provider is Dr Jorge Luis Griggs who is not in the office today. Thank you.

## 2022-12-07 NOTE — PROGRESS NOTES
Patient was seen for Depo Provera injection on 12/07/2022 at 8:30 am   Patient to return in 12 weeks        Bartolo Davis 47: [id number]    Exp:  06/30/2024    Lot#: [id number]

## 2023-02-22 ENCOUNTER — TELEPHONE (OUTPATIENT)
Dept: GYNECOLOGY | Facility: CLINIC | Age: 33
End: 2023-02-22

## 2023-02-22 ENCOUNTER — OFFICE VISIT (OUTPATIENT)
Dept: GYNECOLOGY | Facility: CLINIC | Age: 33
End: 2023-02-22

## 2023-02-22 DIAGNOSIS — Z30.42 ENCOUNTER FOR DEPO-PROVERA CONTRACEPTION: Primary | ICD-10-CM

## 2023-02-22 RX ORDER — MEDROXYPROGESTERONE ACETATE 150 MG/ML
150 INJECTION, SUSPENSION INTRAMUSCULAR ONCE
Status: COMPLETED | OUTPATIENT
Start: 2023-02-22 | End: 2023-02-22

## 2023-02-22 RX ADMIN — MEDROXYPROGESTERONE ACETATE 150 MG: 150 INJECTION, SUSPENSION INTRAMUSCULAR at 09:00

## 2023-02-22 NOTE — TELEPHONE ENCOUNTER
Carroll Teran arrived on 02/22/23 at 0900 in the [facility] office  Please sign off on her order  The ordering provider is Dr Harry Cameron who is not in the office today  Thank you

## 2023-04-27 ENCOUNTER — TELEPHONE (OUTPATIENT)
Dept: GYNECOLOGY | Facility: CLINIC | Age: 33
End: 2023-04-27

## 2023-05-09 ENCOUNTER — OFFICE VISIT (OUTPATIENT)
Dept: GYNECOLOGY | Facility: CLINIC | Age: 33
End: 2023-05-09

## 2023-05-09 VITALS
WEIGHT: 271 LBS | SYSTOLIC BLOOD PRESSURE: 118 MMHG | BODY MASS INDEX: 42.53 KG/M2 | DIASTOLIC BLOOD PRESSURE: 76 MMHG | HEIGHT: 67 IN

## 2023-05-09 DIAGNOSIS — Z30.42 ENCOUNTER FOR DEPO-PROVERA CONTRACEPTION: Primary | ICD-10-CM

## 2023-05-09 RX ORDER — MEDROXYPROGESTERONE ACETATE 150 MG/ML
150 INJECTION, SUSPENSION INTRAMUSCULAR ONCE
Status: COMPLETED | OUTPATIENT
Start: 2023-05-09 | End: 2023-05-09

## 2023-05-09 RX ADMIN — MEDROXYPROGESTERONE ACETATE 150 MG: 150 INJECTION, SUSPENSION INTRAMUSCULAR at 10:01

## 2023-05-09 NOTE — PROGRESS NOTES
Major Cocking presents for a scheduled depo injection  This was administered in the left deltoid without any difficulty  Pt has the following complaints/concerns none   A notice was sent to Dr Kira Persaud to sign off on order  Pt will schedule next injection between 7/25/23 - 8/8/23      Bartolo Davis 47- [phone]    LOT- [id number]    EXP- 11/2024

## 2023-08-15 ENCOUNTER — TELEPHONE (OUTPATIENT)
Dept: GYNECOLOGY | Facility: CLINIC | Age: 33
End: 2023-08-15

## 2023-08-15 ENCOUNTER — OFFICE VISIT (OUTPATIENT)
Dept: GYNECOLOGY | Facility: CLINIC | Age: 33
End: 2023-08-15

## 2023-08-15 VITALS — SYSTOLIC BLOOD PRESSURE: 122 MMHG | BODY MASS INDEX: 37.01 KG/M2 | WEIGHT: 232.8 LBS | DIASTOLIC BLOOD PRESSURE: 80 MMHG

## 2023-08-15 DIAGNOSIS — Z30.42 ENCOUNTER FOR DEPO-PROVERA CONTRACEPTION: Primary | ICD-10-CM

## 2023-08-15 RX ORDER — ATORVASTATIN CALCIUM 20 MG/1
1 TABLET, FILM COATED ORAL EVERY EVENING
COMMUNITY
Start: 2023-05-03

## 2023-08-15 RX ORDER — MEDROXYPROGESTERONE ACETATE 150 MG/ML
150 INJECTION, SUSPENSION INTRAMUSCULAR ONCE
Status: COMPLETED | OUTPATIENT
Start: 2023-08-15 | End: 2023-08-15

## 2023-08-15 RX ORDER — ATORVASTATIN CALCIUM 20 MG/1
TABLET, FILM COATED ORAL
COMMUNITY
Start: 2023-07-28

## 2023-08-15 RX ORDER — DULAGLUTIDE 4.5 MG/.5ML
INJECTION, SOLUTION SUBCUTANEOUS
COMMUNITY
Start: 2023-07-02

## 2023-08-15 RX ADMIN — MEDROXYPROGESTERONE ACETATE 150 MG: 150 INJECTION, SUSPENSION INTRAMUSCULAR at 12:21

## 2023-08-15 NOTE — TELEPHONE ENCOUNTER
Sagar Staton presents for a scheduled Depo injection. This was administered in the Right upper Arm without any difficulty. Pt has the following complaints/concerns none . A notice was sent to Dr. Chirag Ocasio to sign off on order. Pt will schedule next injection between 11/15/23 - 11/22/23.     [address]- [id number]    LOT- [id number]    EXP- 03/2025

## 2023-11-14 ENCOUNTER — CLINICAL SUPPORT (OUTPATIENT)
Dept: GYNECOLOGY | Facility: CLINIC | Age: 33
End: 2023-11-14

## 2023-11-14 ENCOUNTER — TELEPHONE (OUTPATIENT)
Dept: GYNECOLOGY | Facility: CLINIC | Age: 33
End: 2023-11-14

## 2023-11-14 VITALS — SYSTOLIC BLOOD PRESSURE: 120 MMHG | DIASTOLIC BLOOD PRESSURE: 74 MMHG | WEIGHT: 267.2 LBS | BODY MASS INDEX: 42.48 KG/M2

## 2023-11-14 DIAGNOSIS — Z30.42 ENCOUNTER FOR DEPO-PROVERA CONTRACEPTION: Primary | ICD-10-CM

## 2023-11-14 RX ORDER — MEDROXYPROGESTERONE ACETATE 150 MG/ML
150 INJECTION, SUSPENSION INTRAMUSCULAR ONCE
Status: COMPLETED | OUTPATIENT
Start: 2023-11-14 | End: 2023-11-14

## 2023-11-14 RX ADMIN — MEDROXYPROGESTERONE ACETATE 150 MG: 150 INJECTION, SUSPENSION INTRAMUSCULAR at 09:27

## 2023-11-14 NOTE — TELEPHONE ENCOUNTER
Catia Cotton presents for a scheduled Depo injection. This was administered in the Left deltoid without any difficulty. Pt has the following complaints/concerns none . A notice was sent to Dr. Demetrio Aguilar to sign off on order. Pt scheduled next injection 2/7/2024.     [address]- [id number]    LOT- [id number]    EXP- 4/31/25

## 2024-02-07 ENCOUNTER — CLINICAL SUPPORT (OUTPATIENT)
Dept: GYNECOLOGY | Facility: CLINIC | Age: 34
End: 2024-02-07
Payer: COMMERCIAL

## 2024-02-07 ENCOUNTER — TELEPHONE (OUTPATIENT)
Dept: GYNECOLOGY | Facility: CLINIC | Age: 34
End: 2024-02-07

## 2024-02-07 VITALS — SYSTOLIC BLOOD PRESSURE: 124 MMHG | WEIGHT: 261.4 LBS | BODY MASS INDEX: 41.56 KG/M2 | DIASTOLIC BLOOD PRESSURE: 80 MMHG

## 2024-02-07 DIAGNOSIS — Z30.42 ENCOUNTER FOR DEPO-PROVERA CONTRACEPTION: Primary | ICD-10-CM

## 2024-02-07 PROCEDURE — 96372 THER/PROPH/DIAG INJ SC/IM: CPT

## 2024-02-07 RX ORDER — MEDROXYPROGESTERONE ACETATE 150 MG/ML
150 INJECTION, SUSPENSION INTRAMUSCULAR ONCE
Status: COMPLETED | OUTPATIENT
Start: 2024-02-07 | End: 2024-02-07

## 2024-02-07 RX ADMIN — MEDROXYPROGESTERONE ACETATE 150 MG: 150 INJECTION, SUSPENSION INTRAMUSCULAR at 13:32

## 2024-02-07 NOTE — TELEPHONE ENCOUNTER
Rachel Santos presents for a scheduled Depo injection. This was administered in the Right deltoid without any difficulty.  Pt has the following complaints/concerns none .    A notice was sent to Dr. Paris to sign off on order.  Pt scheduled next injection for  4/22/24.    NDC- 0023-0769-75    LOT- [id number]    EXP- 4/30/25

## 2024-04-22 ENCOUNTER — ANNUAL EXAM (OUTPATIENT)
Dept: GYNECOLOGY | Facility: CLINIC | Age: 34
End: 2024-04-22
Payer: MEDICARE

## 2024-04-22 ENCOUNTER — TELEPHONE (OUTPATIENT)
Dept: GYNECOLOGY | Facility: CLINIC | Age: 34
End: 2024-04-22

## 2024-04-22 VITALS — BODY MASS INDEX: 40.99 KG/M2 | SYSTOLIC BLOOD PRESSURE: 134 MMHG | DIASTOLIC BLOOD PRESSURE: 78 MMHG | WEIGHT: 257.8 LBS

## 2024-04-22 DIAGNOSIS — Z01.419 ENCOUNTER FOR ANNUAL ROUTINE GYNECOLOGICAL EXAMINATION: ICD-10-CM

## 2024-04-22 DIAGNOSIS — R21 RASH OF BOTH HANDS: ICD-10-CM

## 2024-04-22 DIAGNOSIS — L30.9 EXACERBATION OF ECZEMA: ICD-10-CM

## 2024-04-22 DIAGNOSIS — Z30.42 ON DEPO-PROVERA FOR CONTRACEPTION: ICD-10-CM

## 2024-04-22 DIAGNOSIS — Z30.42 ENCOUNTER FOR DEPO-PROVERA CONTRACEPTION: Primary | ICD-10-CM

## 2024-04-22 PROCEDURE — G0101 CA SCREEN;PELVIC/BREAST EXAM: HCPCS | Performed by: OBSTETRICS & GYNECOLOGY

## 2024-04-22 PROCEDURE — 96372 THER/PROPH/DIAG INJ SC/IM: CPT | Performed by: OBSTETRICS & GYNECOLOGY

## 2024-04-22 RX ORDER — MEDROXYPROGESTERONE ACETATE 150 MG/ML
150 INJECTION, SUSPENSION INTRAMUSCULAR ONCE
Status: COMPLETED | OUTPATIENT
Start: 2024-04-22 | End: 2024-04-22

## 2024-04-22 RX ORDER — MEDROXYPROGESTERONE ACETATE 150 MG/ML
150 INJECTION, SUSPENSION INTRAMUSCULAR
Qty: 1 ML | Refills: 4 | Status: SHIPPED | OUTPATIENT
Start: 2024-04-22

## 2024-04-22 RX ADMIN — MEDROXYPROGESTERONE ACETATE 150 MG: 150 INJECTION, SUSPENSION INTRAMUSCULAR at 09:57

## 2024-04-22 NOTE — PROGRESS NOTES
Assessment/Plan:    pap is up to date       Discussed self breast exams    Rash - referral to derm placed    Cutaneous candidiasis under breasts    Contraception -she is happy with Depo-Provera, she will continue with this RX depo provera renewed    discussed preventive care, regular exercise and a healthy diet      No problem-specific Assessment & Plan notes found for this encounter.       Diagnoses and all orders for this visit:    Encounter for Depo-Provera contraception  -     medroxyPROGESTERone acetate (DEPO-PROVERA SYRINGE) IM injection 150 mg    Encounter for annual routine gynecological examination    Exacerbation of eczema  -     Ambulatory Referral to Dermatology; Future    Rash of both hands  -     Ambulatory Referral to Dermatology; Future    On Depo-Provera for contraception  -     medroxyPROGESTERone acetate (DEPO-PROVERA SYRINGE) 150 mg/mL injection; Inject 1 mL (150 mg total) into a muscle every 3 (three) months          Subjective:      Patient ID: Rachel Santos is a 33 y.o. female.    Patient here for yearly.  She is on Depo-Provera for contraception.  She is doing well on this and is happy with that and would like to continue.    She has noticed a rash on her hands for a day or 2.  It is somewhat itchy.  She has no other complaints.      Normal Pap, negative HPV in April 2022        The following portions of the patient's history were reviewed and updated as appropriate: allergies, current medications, past family history, past medical history, past social history, past surgical history, and problem list.    Review of Systems   Constitutional: Negative.    Gastrointestinal: Negative.    Genitourinary: Negative.    Skin:  Positive for rash.         Objective:      /78 (BP Location: Left arm, Patient Position: Sitting)   Wt 117 kg (257 lb 12.8 oz)   BMI 40.99 kg/m²          Physical Exam  Vitals reviewed.   Constitutional:       Appearance: Normal appearance. She is well-developed.    Neck:      Thyroid: No thyromegaly.      Trachea: No tracheal deviation.   Cardiovascular:      Rate and Rhythm: Normal rate and regular rhythm.   Pulmonary:      Effort: Pulmonary effort is normal.      Breath sounds: Normal breath sounds.   Chest:   Breasts:     Breasts are symmetrical.      Right: Normal. No inverted nipple, mass, nipple discharge, skin change or tenderness.      Left: Normal. No inverted nipple, mass, nipple discharge, skin change or tenderness.   Abdominal:      General: There is no distension.      Palpations: Abdomen is soft. There is no mass.      Tenderness: There is no abdominal tenderness.   Genitourinary:     Labia:         Right: No rash, tenderness, lesion or injury.         Left: No rash, tenderness, lesion or injury.       Vagina: Normal.      Cervix: Normal. No cervical motion tenderness, discharge or friability.      Uterus: Normal.       Adnexa: Right adnexa normal and left adnexa normal.        Right: No mass, tenderness or fullness.          Left: No mass, tenderness or fullness.     Musculoskeletal:      Comments: Rash on both hands   Neurological:      Mental Status: She is alert.

## 2024-04-22 NOTE — TELEPHONE ENCOUNTER
Rachel Herrerafrancoiseheather presents for a scheduled Depo injection. This was administered in the Left deltoid without any difficulty.  Pt has the following complaints/concerns Needs refill .    A notice was sent to Dr. Acharya to sign off on order.  Pt will schedule next injection between 7/8/24-7/22/24.    NDC- 5663-6418-37    LOT- [id number]    EXP- 5/31/25

## 2024-07-08 ENCOUNTER — CLINICAL SUPPORT (OUTPATIENT)
Dept: GYNECOLOGY | Facility: CLINIC | Age: 34
End: 2024-07-08
Payer: COMMERCIAL

## 2024-07-08 VITALS — BODY MASS INDEX: 39.52 KG/M2 | WEIGHT: 248.6 LBS | DIASTOLIC BLOOD PRESSURE: 64 MMHG | SYSTOLIC BLOOD PRESSURE: 118 MMHG

## 2024-07-08 DIAGNOSIS — Z30.42 ENCOUNTER FOR DEPO-PROVERA CONTRACEPTION: Primary | ICD-10-CM

## 2024-07-08 PROCEDURE — 96372 THER/PROPH/DIAG INJ SC/IM: CPT

## 2024-07-08 RX ORDER — MEDROXYPROGESTERONE ACETATE 150 MG/ML
150 INJECTION, SUSPENSION INTRAMUSCULAR ONCE
Status: COMPLETED | OUTPATIENT
Start: 2024-07-08 | End: 2024-07-08

## 2024-07-08 RX ADMIN — MEDROXYPROGESTERONE ACETATE 150 MG: 150 INJECTION, SUSPENSION INTRAMUSCULAR at 15:47

## 2024-07-08 NOTE — PROGRESS NOTES
Rachel Santos presents for a scheduled Depo injection. This was administered in the Right deltoid without any difficulty.  Pt has the following complaints/concerns none .    A notice was sent to Dr. Acharya to sign off on order.  Pt will schedule next injection between 9/23/24 - 10/7/24.    NDC- 02355-581-27    LOT- [id number]; [id number]    EXP- 12/31/25

## 2024-09-24 ENCOUNTER — CLINICAL SUPPORT (OUTPATIENT)
Dept: GYNECOLOGY | Facility: CLINIC | Age: 34
End: 2024-09-24
Payer: COMMERCIAL

## 2024-09-24 VITALS — WEIGHT: 241 LBS | DIASTOLIC BLOOD PRESSURE: 80 MMHG | BODY MASS INDEX: 38.32 KG/M2 | SYSTOLIC BLOOD PRESSURE: 126 MMHG

## 2024-09-24 DIAGNOSIS — Z30.42 ENCOUNTER FOR DEPO-PROVERA CONTRACEPTION: Primary | ICD-10-CM

## 2024-09-24 PROCEDURE — 96372 THER/PROPH/DIAG INJ SC/IM: CPT

## 2024-09-24 RX ORDER — MEDROXYPROGESTERONE ACETATE 150 MG/ML
150 INJECTION, SUSPENSION INTRAMUSCULAR ONCE
Status: COMPLETED | OUTPATIENT
Start: 2024-09-24 | End: 2024-09-24

## 2024-09-24 RX ADMIN — MEDROXYPROGESTERONE ACETATE 150 MG: 150 INJECTION, SUSPENSION INTRAMUSCULAR at 13:44

## 2024-09-24 NOTE — PROGRESS NOTES
Rachel Santos presents for a scheduled Depo injection. This was administered in the L deltoid without any difficulty.  Pt has the following complaints/concerns none .    A notice was sent to Dr. Acharya to sign off on order.  Pt will schedule next injection between 12/10/24 - 12/24/24.    NDC- 65743-887-36    LOT- [id number]    EXP- 11/30/27

## 2024-12-10 ENCOUNTER — CLINICAL SUPPORT (OUTPATIENT)
Dept: GYNECOLOGY | Facility: CLINIC | Age: 34
End: 2024-12-10
Payer: COMMERCIAL

## 2024-12-10 VITALS — DIASTOLIC BLOOD PRESSURE: 84 MMHG | SYSTOLIC BLOOD PRESSURE: 126 MMHG | BODY MASS INDEX: 39.14 KG/M2 | WEIGHT: 246.2 LBS

## 2024-12-10 DIAGNOSIS — Z30.42 ENCOUNTER FOR DEPO-PROVERA CONTRACEPTION: Primary | ICD-10-CM

## 2024-12-10 PROCEDURE — 96372 THER/PROPH/DIAG INJ SC/IM: CPT

## 2024-12-10 RX ORDER — MEDROXYPROGESTERONE ACETATE 150 MG/ML
150 INJECTION, SUSPENSION INTRAMUSCULAR ONCE
Status: COMPLETED | OUTPATIENT
Start: 2024-12-10 | End: 2024-12-10

## 2024-12-10 RX ADMIN — MEDROXYPROGESTERONE ACETATE 150 MG: 150 INJECTION, SUSPENSION INTRAMUSCULAR at 01:00

## 2024-12-10 NOTE — PROGRESS NOTES
Rachel Santos presents for a scheduled medroxyprogesterone 150mg injection. This was administered in the R deltoid without any difficulty.  Pt has the following complaints/concerns none .    A notice was sent to Dr. Acharya to sign off on order.  Pt will schedule next injection between 02/25/25 - 03/11/2025.    NDC- 97857-762-45    LOT- [id number]    EXP- 11/30/2027

## 2025-03-03 ENCOUNTER — CLINICAL SUPPORT (OUTPATIENT)
Dept: GYNECOLOGY | Facility: CLINIC | Age: 35
End: 2025-03-03
Payer: COMMERCIAL

## 2025-03-03 VITALS — SYSTOLIC BLOOD PRESSURE: 126 MMHG | BODY MASS INDEX: 39.59 KG/M2 | DIASTOLIC BLOOD PRESSURE: 72 MMHG | WEIGHT: 249 LBS

## 2025-03-03 DIAGNOSIS — Z30.42 ENCOUNTER FOR DEPO-PROVERA CONTRACEPTION: Primary | ICD-10-CM

## 2025-03-03 PROCEDURE — 96372 THER/PROPH/DIAG INJ SC/IM: CPT

## 2025-03-03 RX ORDER — MEDROXYPROGESTERONE ACETATE 150 MG/ML
150 INJECTION, SUSPENSION INTRAMUSCULAR ONCE
Status: COMPLETED | OUTPATIENT
Start: 2025-03-03 | End: 2025-03-03

## 2025-03-03 RX ADMIN — MEDROXYPROGESTERONE ACETATE 150 MG: 150 INJECTION, SUSPENSION INTRAMUSCULAR at 09:49

## 2025-03-03 NOTE — PROGRESS NOTES
Rachel Santos presents for a scheduled Depo injection. This was administered in the Left Deltoid without any difficulty.  Pt has the following complaints/concerns none .    A notice was sent to Dr. Acharya to sign off on order.  Pt will schedule next injection between 5/19/25 - 6/2/25.    NDC- 52046-583-81    LOT- [id number]    EXP- 4/30/28

## 2025-04-15 ENCOUNTER — TELEPHONE (OUTPATIENT)
Dept: GYNECOLOGY | Facility: CLINIC | Age: 35
End: 2025-04-15

## 2025-04-15 NOTE — TELEPHONE ENCOUNTER
Left message for patient to call office and r/s depo appointment on 5/26/25 due to office being closed for Memorial Day

## 2025-05-05 ENCOUNTER — ANNUAL EXAM (OUTPATIENT)
Dept: GYNECOLOGY | Facility: CLINIC | Age: 35
End: 2025-05-05
Payer: COMMERCIAL

## 2025-05-05 ENCOUNTER — TELEPHONE (OUTPATIENT)
Age: 35
End: 2025-05-05

## 2025-05-05 VITALS — WEIGHT: 250 LBS | DIASTOLIC BLOOD PRESSURE: 70 MMHG | BODY MASS INDEX: 39.75 KG/M2 | SYSTOLIC BLOOD PRESSURE: 118 MMHG

## 2025-05-05 DIAGNOSIS — Z32.02 PREGNANCY EXAMINATION OR TEST, NEGATIVE RESULT: Primary | ICD-10-CM

## 2025-05-05 DIAGNOSIS — B37.2 CUTANEOUS CANDIDIASIS: ICD-10-CM

## 2025-05-05 DIAGNOSIS — Z30.42 ON DEPO-PROVERA FOR CONTRACEPTION: ICD-10-CM

## 2025-05-05 DIAGNOSIS — Z01.419 ENCOUNTER FOR ANNUAL ROUTINE GYNECOLOGICAL EXAMINATION: ICD-10-CM

## 2025-05-05 LAB — SL AMB POCT URINE HCG: NEGATIVE

## 2025-05-05 PROCEDURE — G0101 CA SCREEN;PELVIC/BREAST EXAM: HCPCS | Performed by: OBSTETRICS & GYNECOLOGY

## 2025-05-05 PROCEDURE — 81025 URINE PREGNANCY TEST: CPT | Performed by: OBSTETRICS & GYNECOLOGY

## 2025-05-05 RX ORDER — TIRZEPATIDE 15 MG/.5ML
INJECTION, SOLUTION SUBCUTANEOUS
COMMUNITY

## 2025-05-05 RX ORDER — NYSTATIN 100000 U/G
CREAM TOPICAL 2 TIMES DAILY
Qty: 30 G | Refills: 1 | Status: SHIPPED | OUTPATIENT
Start: 2025-05-05 | End: 2025-05-12

## 2025-05-05 RX ORDER — INSULIN GLARGINE 300 U/ML
INJECTION, SOLUTION SUBCUTANEOUS
COMMUNITY
Start: 2025-04-10

## 2025-05-05 RX ORDER — MEDROXYPROGESTERONE ACETATE 150 MG/ML
150 INJECTION, SUSPENSION INTRAMUSCULAR
Qty: 1 ML | Refills: 4 | Status: SHIPPED | OUTPATIENT
Start: 2025-05-05

## 2025-05-05 NOTE — TELEPHONE ENCOUNTER
Patient's mother, Sarah calling in, Rachel also present. Inquiring about UPT done in office today, as depo shot is coming up. Advised of normal/negative results. No further questions.

## 2025-05-05 NOTE — PROGRESS NOTES
Name: Rachel Santos      : 1990      MRN: [id number]  Encounter Provider: Fabi Acharya DO  Encounter Date: 2025   Encounter department: [facility]  :  Assessment & Plan  Encounter for annual routine gynecological examination         pap is up to date, will do next year      Discussed self breast exams/awareness    Contraception-  We discussed the  study regarding long-term use of certain progesterone products that can slightly increase the risk of meningioma.  I discussed this with both her and her mother at her request.  They would like to continue with Depo-Provera as this has worked well for her.  Prescription renewed    Cutaneous candidiasis-she periodically gets this under her breast.  Nystatin cream prescription sent for her.    discussed preventive care, regular exercise and a healthy diet    hCG negative today    History of Present Illness   HPI  Rachel Santos is a 34 y.o. female who presents for yearly.  She is on Depo-Provera for contraception.  She is having some bleeding.  She had an episode of vomiting this morning as well.  She typically does not have bleeding, this does occur occasionally.  She is happy with Depo Provera.  She is very regular and does not miss any doses    Normal Pap, negative HPV in 2022      Review of Systems   Constitutional: Negative.    Gastrointestinal: Negative.    Genitourinary: Negative.           Objective   There were no vitals taken for this visit.     Physical Exam  Vitals and nursing note reviewed. Exam conducted with a chaperone present.   Constitutional:       Appearance: She is well-developed.   HENT:      Head: Normocephalic and atraumatic.   Neck:      Thyroid: No thyromegaly.   Cardiovascular:      Rate and Rhythm: Normal rate and regular rhythm.   Pulmonary:      Effort: Pulmonary effort is normal.      Breath sounds: Normal breath sounds.   Chest:   Breasts:     Right: Normal.       Left: Normal.      Comments: Examined seated and supine  Erythema under breasts  Abdominal:      Palpations: Abdomen is soft.   Genitourinary:     General: Normal vulva.      Vagina: Normal.      Cervix: Normal.      Uterus: Normal.       Adnexa: Right adnexa normal and left adnexa normal.      Comments: Moderate amount of dark blood  Musculoskeletal:      Cervical back: Neck supple.   Skin:     General: Skin is warm and dry.   Neurological:      Mental Status: She is alert.   Psychiatric:         Mood and Affect: Mood normal.

## 2025-05-27 ENCOUNTER — CLINICAL SUPPORT (OUTPATIENT)
Dept: GYNECOLOGY | Facility: CLINIC | Age: 35
End: 2025-05-27
Payer: COMMERCIAL

## 2025-05-27 VITALS — BODY MASS INDEX: 38.47 KG/M2 | WEIGHT: 242 LBS | SYSTOLIC BLOOD PRESSURE: 120 MMHG | DIASTOLIC BLOOD PRESSURE: 74 MMHG

## 2025-05-27 DIAGNOSIS — Z30.42 ENCOUNTER FOR DEPO-PROVERA CONTRACEPTION: Primary | ICD-10-CM

## 2025-05-27 PROCEDURE — 96372 THER/PROPH/DIAG INJ SC/IM: CPT

## 2025-05-27 RX ORDER — MEDROXYPROGESTERONE ACETATE 150 MG/ML
150 INJECTION, SUSPENSION INTRAMUSCULAR ONCE
Status: COMPLETED | OUTPATIENT
Start: 2025-05-27 | End: 2025-05-27

## 2025-05-27 RX ADMIN — MEDROXYPROGESTERONE ACETATE 150 MG: 150 INJECTION, SUSPENSION INTRAMUSCULAR at 13:15

## 2025-05-27 NOTE — PROGRESS NOTES
Rachel Santos presents for a scheduled Depo injection. This was administered in the Right Deltoid without any difficulty.  Pt has the following complaints/concerns None .    A notice was sent to Dr. Acharya to sign off on order.  Pt will schedule next injection between 8/12/25 - 8/26/25.    NDC- 46636-857-78    LOT- [id number]    EXP- 10/31/28      Pt needs refills, send to [facility].

## (undated) DEVICE — D + E SAFE TOUCH TISSUE TRAP (CIRCON)

## (undated) DEVICE — D + E CONNECTION HOSE

## (undated) DEVICE — STRL ALLENTOWN HYSTEROSCOPY PK: Brand: CARDINAL HEALTH

## (undated) DEVICE — CHLORHEXIDINE 4PCT 4 OZ

## (undated) DEVICE — SPONGE LAP 18 X 18 IN STRL RFD

## (undated) DEVICE — PVC URETHRAL CATHETER: Brand: DOVER

## (undated) DEVICE — LIGHT HANDLE COVER SLEEVE DISP BLUE STELLAR

## (undated) DEVICE — GLOVE INDICATOR PI UNDERGLOVE SZ 7 BLUE

## (undated) DEVICE — COLLECTION SET, DISPOSABLE WITH HANDLE AND TAPERED FITTINGS TUBING, 6 FT (183 CM): Brand: GYRUS ACMI

## (undated) DEVICE — PREMIUM DRY TRAY LF: Brand: MEDLINE INDUSTRIES, INC.

## (undated) DEVICE — GLOVE PI ULTRA TOUCH SZ.6.5

## (undated) DEVICE — STERILE SURGICAL LUBRICANT,  TUBE: Brand: SURGILUBE

## (undated) DEVICE — D + E SUCTION CANISTER